# Patient Record
Sex: MALE | Race: WHITE | Employment: OTHER | URBAN - METROPOLITAN AREA
[De-identification: names, ages, dates, MRNs, and addresses within clinical notes are randomized per-mention and may not be internally consistent; named-entity substitution may affect disease eponyms.]

---

## 2024-08-23 ENCOUNTER — APPOINTMENT (OUTPATIENT)
Dept: GENERAL RADIOLOGY | Age: 84
DRG: 055 | End: 2024-08-23
Payer: MEDICAID

## 2024-08-23 ENCOUNTER — APPOINTMENT (OUTPATIENT)
Dept: CT IMAGING | Age: 84
DRG: 055 | End: 2024-08-23
Payer: MEDICAID

## 2024-08-23 ENCOUNTER — HOSPITAL ENCOUNTER (EMERGENCY)
Age: 84
Discharge: ANOTHER HOSPITAL | DRG: 055 | End: 2024-08-23
Payer: MEDICAID

## 2024-08-23 ENCOUNTER — APPOINTMENT (OUTPATIENT)
Age: 84
DRG: 055 | End: 2024-08-23
Payer: MEDICAID

## 2024-08-23 ENCOUNTER — HOSPITAL ENCOUNTER (INPATIENT)
Age: 84
LOS: 4 days | Discharge: HOME | DRG: 055 | End: 2024-08-27
Attending: EMERGENCY MEDICINE | Admitting: SURGERY
Payer: MEDICAID

## 2024-08-23 VITALS
HEART RATE: 72 BPM | WEIGHT: 149 LBS | BODY MASS INDEX: 23.39 KG/M2 | HEIGHT: 67 IN | RESPIRATION RATE: 19 BRPM | SYSTOLIC BLOOD PRESSURE: 150 MMHG | OXYGEN SATURATION: 96 % | DIASTOLIC BLOOD PRESSURE: 50 MMHG | TEMPERATURE: 98.1 F

## 2024-08-23 DIAGNOSIS — R55 SYNCOPE, UNSPECIFIED SYNCOPE TYPE: ICD-10-CM

## 2024-08-23 DIAGNOSIS — I62.9 INTRACRANIAL HEMORRHAGE (HCC): Primary | ICD-10-CM

## 2024-08-23 DIAGNOSIS — I60.9 SAH (SUBARACHNOID HEMORRHAGE) (HCC): ICD-10-CM

## 2024-08-23 DIAGNOSIS — R55 SYNCOPE AND COLLAPSE: Primary | ICD-10-CM

## 2024-08-23 DIAGNOSIS — R55 PRE-SYNCOPE: ICD-10-CM

## 2024-08-23 LAB
25(OH)D3 SERPL-MCNC: 36.4 NG/ML (ref 30–100)
ABO + RH BLD: NORMAL
ALBUMIN SERPL-MCNC: 4.1 G/DL (ref 3.5–5.2)
ALBUMIN SERPL-MCNC: 4.4 G/DL (ref 3.5–5.2)
ALP SERPL-CCNC: 53 U/L (ref 40–129)
ALT SERPL-CCNC: 22 U/L (ref 5–41)
AMPHET UR QL SCN: NEGATIVE
ANION GAP SERPL CALCULATED.3IONS-SCNC: 12 MMOL/L (ref 9–16)
ANION GAP SERPL CALCULATED.3IONS-SCNC: 13 MMOL/L
ARM BAND NUMBER: NORMAL
AST SERPL-CCNC: 20 U/L
BARBITURATES UR QL SCN: NEGATIVE
BASOPHILS # BLD: 0.03 K/UL (ref 0–0.2)
BASOPHILS NFR BLD: 1 % (ref 0–2)
BENZODIAZ UR QL: NEGATIVE
BILIRUB SERPL-MCNC: 0.6 MG/DL (ref 0.3–1.2)
BILIRUB UR QL STRIP: NEGATIVE
BLOOD BANK SAMPLE EXPIRATION: NORMAL
BLOOD BANK SPECIMEN: ABNORMAL
BLOOD GROUP ANTIBODIES SERPL: NEGATIVE
BODY TEMPERATURE: 37
BUN SERPL-MCNC: 20 MG/DL (ref 8–23)
BUN SERPL-MCNC: 22 MG/DL (ref 8–23)
BUN/CREAT SERPL: 20 (ref 9–20)
CALCIUM SERPL-MCNC: 9.3 MG/DL (ref 8.6–10.4)
CANNABINOIDS UR QL SCN: NEGATIVE
CHLORIDE SERPL-SCNC: 100 MMOL/L (ref 98–107)
CHLORIDE SERPL-SCNC: 103 MMOL/L (ref 98–107)
CK SERPL-CCNC: 220 U/L (ref 39–308)
CLARITY UR: CLEAR
CO2 SERPL-SCNC: 23 MMOL/L (ref 20–31)
CO2 SERPL-SCNC: 24 MMOL/L (ref 20–31)
COCAINE UR QL SCN: NEGATIVE
COHGB MFR BLD: 1.1 % (ref 0–5)
COLOR UR: YELLOW
COMMENT: ABNORMAL
CREAT SERPL-MCNC: 1.1 MG/DL (ref 0.7–1.2)
CREAT SERPL-MCNC: 1.3 MG/DL (ref 0.7–1.2)
EKG ATRIAL RATE: 70 BPM
EKG P AXIS: 69 DEGREES
EKG P-R INTERVAL: 164 MS
EKG Q-T INTERVAL: 384 MS
EKG QRS DURATION: 92 MS
EKG QTC CALCULATION (BAZETT): 414 MS
EKG R AXIS: -2 DEGREES
EKG T AXIS: 57 DEGREES
EKG VENTRICULAR RATE: 70 BPM
EOSINOPHIL # BLD: 0.08 K/UL (ref 0–0.44)
EOSINOPHILS RELATIVE PERCENT: 2 % (ref 1–4)
ERYTHROCYTE [DISTWIDTH] IN BLOOD BY AUTOMATED COUNT: 13 % (ref 11.8–14.4)
ERYTHROCYTE [DISTWIDTH] IN BLOOD BY AUTOMATED COUNT: 13 % (ref 11.8–14.4)
EST. AVERAGE GLUCOSE BLD GHB EST-MCNC: 126 MG/DL
ETHANOL PERCENT: <0.01 %
ETHANOLAMINE SERPL-MCNC: <10 MG/DL (ref 0–0.08)
FENTANYL UR QL: NEGATIVE
FIO2 ON VENT: ABNORMAL %
GFR, ESTIMATED: 56 ML/MIN/1.73M2
GFR, ESTIMATED: 66 ML/MIN/1.73M2
GLUCOSE BLD-MCNC: 116 MG/DL (ref 75–110)
GLUCOSE BLD-MCNC: 94 MG/DL (ref 75–110)
GLUCOSE SERPL-MCNC: 116 MG/DL (ref 74–99)
GLUCOSE SERPL-MCNC: 121 MG/DL (ref 70–99)
GLUCOSE UR STRIP-MCNC: NEGATIVE MG/DL
HBA1C MFR BLD: 6 % (ref 4–6)
HCO3 VENOUS: 28.9 MMOL/L (ref 24–30)
HCT VFR BLD AUTO: 49.3 % (ref 40.7–50.3)
HCT VFR BLD AUTO: 50.1 % (ref 40.7–50.3)
HGB BLD-MCNC: 16.6 G/DL (ref 13–17)
HGB BLD-MCNC: 16.7 G/DL (ref 13–17)
HGB UR QL STRIP.AUTO: NEGATIVE
IMM GRANULOCYTES # BLD AUTO: 0.01 K/UL (ref 0–0.3)
IMM GRANULOCYTES NFR BLD: 0 %
INR PPP: 1
INR PPP: 1.1
KETONES UR STRIP-MCNC: NEGATIVE MG/DL
LEUKOCYTE ESTERASE UR QL STRIP: NEGATIVE
LYMPHOCYTES NFR BLD: 1.37 K/UL (ref 1.1–3.7)
LYMPHOCYTES RELATIVE PERCENT: 29 % (ref 24–43)
MCH RBC QN AUTO: 29 PG (ref 25.2–33.5)
MCH RBC QN AUTO: 29 PG (ref 25.2–33.5)
MCHC RBC AUTO-ENTMCNC: 33.3 G/DL (ref 28.4–34.8)
MCHC RBC AUTO-ENTMCNC: 33.7 G/DL (ref 28.4–34.8)
MCV RBC AUTO: 86.2 FL (ref 82.6–102.9)
MCV RBC AUTO: 87 FL (ref 82.6–102.9)
METHADONE UR QL: NEGATIVE
MONOCYTES NFR BLD: 0.37 K/UL (ref 0.1–1.2)
MONOCYTES NFR BLD: 8 % (ref 3–12)
MYOGLOBIN SERPL-MCNC: 211 NG/ML (ref 28–72)
NEUTROPHILS NFR BLD: 60 % (ref 36–65)
NEUTS SEG NFR BLD: 2.82 K/UL (ref 1.5–8.1)
NITRITE UR QL STRIP: NEGATIVE
NRBC BLD-RTO: 0 PER 100 WBC
NRBC BLD-RTO: 0 PER 100 WBC
O2 SAT, VEN: 47.8 % (ref 60–85)
OPIATES UR QL SCN: NEGATIVE
OXYCODONE UR QL SCN: NEGATIVE
PARTIAL THROMBOPLASTIN TIME: 25.2 SEC (ref 23.9–33.8)
PARTIAL THROMBOPLASTIN TIME: 25.4 SEC (ref 23–36.5)
PCO2 VENOUS: 46.9 MM HG (ref 39–55)
PCP UR QL SCN: NEGATIVE
PH UR STRIP: 5.5 [PH] (ref 5–8)
PH VENOUS: 7.41 (ref 7.32–7.42)
PLATELET # BLD AUTO: 133 K/UL (ref 138–453)
PLATELET # BLD AUTO: 148 K/UL (ref 138–453)
PMV BLD AUTO: 11.4 FL (ref 8.1–13.5)
PMV BLD AUTO: 11.6 FL (ref 8.1–13.5)
PO2 VENOUS: 28.4 MM HG (ref 30–50)
POSITIVE BASE EXCESS, VEN: 3.7 MMOL/L (ref 0–2)
POTASSIUM SERPL-SCNC: 4.2 MMOL/L (ref 3.7–5.3)
POTASSIUM SERPL-SCNC: 4.5 MMOL/L (ref 3.7–5.3)
PROT SERPL-MCNC: 7.4 G/DL (ref 6.4–8.3)
PROT UR STRIP-MCNC: NEGATIVE MG/DL
PROTHROMBIN TIME: 13.5 SEC (ref 11.5–14.2)
PROTHROMBIN TIME: 13.8 SEC (ref 11.7–14.9)
RBC # BLD AUTO: 5.72 M/UL (ref 4.21–5.77)
RBC # BLD AUTO: 5.76 M/UL (ref 4.21–5.77)
SODIUM SERPL-SCNC: 137 MMOL/L (ref 135–144)
SODIUM SERPL-SCNC: 138 MMOL/L (ref 136–145)
SP GR UR STRIP: 1.04 (ref 1–1.03)
T4 FREE SERPL-MCNC: 1.9 NG/DL (ref 0.92–1.68)
TEST INFORMATION: NORMAL
TROPONIN I SERPL HS-MCNC: 25 NG/L (ref 0–22)
TROPONIN I SERPL HS-MCNC: 35 NG/L (ref 0–22)
TSH SERPL DL<=0.05 MIU/L-ACNC: 0.02 UIU/ML (ref 0.27–4.2)
UROBILINOGEN UR STRIP-ACNC: NORMAL EU/DL (ref 0–1)
VIT B12 SERPL-MCNC: 833 PG/ML (ref 232–1245)
WBC OTHER # BLD: 4.7 K/UL (ref 3.5–11.3)
WBC OTHER # BLD: 6.5 K/UL (ref 3.5–11.3)

## 2024-08-23 PROCEDURE — 93005 ELECTROCARDIOGRAM TRACING: CPT

## 2024-08-23 PROCEDURE — 73080 X-RAY EXAM OF ELBOW: CPT

## 2024-08-23 PROCEDURE — 6370000000 HC RX 637 (ALT 250 FOR IP): Performed by: STUDENT IN AN ORGANIZED HEALTH CARE EDUCATION/TRAINING PROGRAM

## 2024-08-23 PROCEDURE — 99285 EMERGENCY DEPT VISIT HI MDM: CPT

## 2024-08-23 PROCEDURE — 85025 COMPLETE CBC W/AUTO DIFF WBC: CPT

## 2024-08-23 PROCEDURE — 85730 THROMBOPLASTIN TIME PARTIAL: CPT

## 2024-08-23 PROCEDURE — 6810039001 HC L1 TRAUMA PRIORITY

## 2024-08-23 PROCEDURE — 90715 TDAP VACCINE 7 YRS/> IM: CPT

## 2024-08-23 PROCEDURE — 81003 URINALYSIS AUTO W/O SCOPE: CPT

## 2024-08-23 PROCEDURE — 82805 BLOOD GASES W/O2 SATURATION: CPT

## 2024-08-23 PROCEDURE — 82947 ASSAY GLUCOSE BLOOD QUANT: CPT

## 2024-08-23 PROCEDURE — G0480 DRUG TEST DEF 1-7 CLASSES: HCPCS

## 2024-08-23 PROCEDURE — 90471 IMMUNIZATION ADMIN: CPT

## 2024-08-23 PROCEDURE — 6360000002 HC RX W HCPCS

## 2024-08-23 PROCEDURE — 82565 ASSAY OF CREATININE: CPT

## 2024-08-23 PROCEDURE — 86901 BLOOD TYPING SEROLOGIC RH(D): CPT

## 2024-08-23 PROCEDURE — 99222 1ST HOSP IP/OBS MODERATE 55: CPT | Performed by: NEUROLOGICAL SURGERY

## 2024-08-23 PROCEDURE — 85027 COMPLETE CBC AUTOMATED: CPT

## 2024-08-23 PROCEDURE — 80053 COMPREHEN METABOLIC PANEL: CPT

## 2024-08-23 PROCEDURE — 83874 ASSAY OF MYOGLOBIN: CPT

## 2024-08-23 PROCEDURE — 2000000000 HC ICU R&B

## 2024-08-23 PROCEDURE — 83036 HEMOGLOBIN GLYCOSYLATED A1C: CPT

## 2024-08-23 PROCEDURE — 84520 ASSAY OF UREA NITROGEN: CPT

## 2024-08-23 PROCEDURE — 70486 CT MAXILLOFACIAL W/O DYE: CPT

## 2024-08-23 PROCEDURE — 84443 ASSAY THYROID STIM HORMONE: CPT

## 2024-08-23 PROCEDURE — 70450 CT HEAD/BRAIN W/O DYE: CPT

## 2024-08-23 PROCEDURE — 82306 VITAMIN D 25 HYDROXY: CPT

## 2024-08-23 PROCEDURE — 84439 ASSAY OF FREE THYROXINE: CPT

## 2024-08-23 PROCEDURE — 92523 SPEECH SOUND LANG COMPREHEN: CPT

## 2024-08-23 PROCEDURE — 70498 CT ANGIOGRAPHY NECK: CPT

## 2024-08-23 PROCEDURE — 85610 PROTHROMBIN TIME: CPT

## 2024-08-23 PROCEDURE — 82607 VITAMIN B-12: CPT

## 2024-08-23 PROCEDURE — 72125 CT NECK SPINE W/O DYE: CPT

## 2024-08-23 PROCEDURE — 99223 1ST HOSP IP/OBS HIGH 75: CPT | Performed by: PSYCHIATRY & NEUROLOGY

## 2024-08-23 PROCEDURE — 6360000004 HC RX CONTRAST MEDICATION: Performed by: NEUROLOGICAL SURGERY

## 2024-08-23 PROCEDURE — 94761 N-INVAS EAR/PLS OXIMETRY MLT: CPT

## 2024-08-23 PROCEDURE — 80307 DRUG TEST PRSMV CHEM ANLYZR: CPT

## 2024-08-23 PROCEDURE — 80051 ELECTROLYTE PANEL: CPT

## 2024-08-23 PROCEDURE — 99223 1ST HOSP IP/OBS HIGH 75: CPT | Performed by: SURGERY

## 2024-08-23 PROCEDURE — 86900 BLOOD TYPING SEROLOGIC ABO: CPT

## 2024-08-23 PROCEDURE — 72170 X-RAY EXAM OF PELVIS: CPT

## 2024-08-23 PROCEDURE — 84703 CHORIONIC GONADOTROPIN ASSAY: CPT

## 2024-08-23 PROCEDURE — 70496 CT ANGIOGRAPHY HEAD: CPT

## 2024-08-23 PROCEDURE — 71250 CT THORAX DX C-: CPT

## 2024-08-23 PROCEDURE — 2580000003 HC RX 258: Performed by: STUDENT IN AN ORGANIZED HEALTH CARE EDUCATION/TRAINING PROGRAM

## 2024-08-23 PROCEDURE — 36415 COLL VENOUS BLD VENIPUNCTURE: CPT

## 2024-08-23 PROCEDURE — 84484 ASSAY OF TROPONIN QUANT: CPT

## 2024-08-23 PROCEDURE — 82040 ASSAY OF SERUM ALBUMIN: CPT

## 2024-08-23 PROCEDURE — 82550 ASSAY OF CK (CPK): CPT

## 2024-08-23 PROCEDURE — 86850 RBC ANTIBODY SCREEN: CPT

## 2024-08-23 RX ORDER — SODIUM CHLORIDE 0.9 % (FLUSH) 0.9 %
5-40 SYRINGE (ML) INJECTION PRN
Status: DISCONTINUED | OUTPATIENT
Start: 2024-08-23 | End: 2024-08-27

## 2024-08-23 RX ORDER — METOPROLOL SUCCINATE 25 MG/1
25 TABLET, EXTENDED RELEASE ORAL DAILY
COMMUNITY

## 2024-08-23 RX ORDER — INSULIN LISPRO 100 [IU]/ML
0-16 INJECTION, SOLUTION INTRAVENOUS; SUBCUTANEOUS EVERY 4 HOURS
Status: DISCONTINUED | OUTPATIENT
Start: 2024-08-23 | End: 2024-08-24

## 2024-08-23 RX ORDER — HYDROCHLOROTHIAZIDE 12.5 MG/1
12.5 CAPSULE ORAL DAILY
Status: DISCONTINUED | OUTPATIENT
Start: 2024-08-23 | End: 2024-08-24

## 2024-08-23 RX ORDER — LABETALOL HYDROCHLORIDE 5 MG/ML
5 INJECTION, SOLUTION INTRAVENOUS ONCE
Status: DISCONTINUED | OUTPATIENT
Start: 2024-08-23 | End: 2024-08-27

## 2024-08-23 RX ORDER — SODIUM CHLORIDE 0.9 % (FLUSH) 0.9 %
3 SYRINGE (ML) INJECTION EVERY 8 HOURS
Status: DISCONTINUED | OUTPATIENT
Start: 2024-08-23 | End: 2024-08-23 | Stop reason: HOSPADM

## 2024-08-23 RX ORDER — SODIUM CHLORIDE 9 MG/ML
INJECTION, SOLUTION INTRAVENOUS CONTINUOUS
Status: DISCONTINUED | OUTPATIENT
Start: 2024-08-23 | End: 2024-08-26

## 2024-08-23 RX ORDER — MAGNESIUM SULFATE IN WATER 40 MG/ML
2000 INJECTION, SOLUTION INTRAVENOUS PRN
Status: DISCONTINUED | OUTPATIENT
Start: 2024-08-23 | End: 2024-08-27

## 2024-08-23 RX ORDER — POLYETHYLENE GLYCOL 3350 17 G/17G
17 POWDER, FOR SOLUTION ORAL DAILY
Status: DISCONTINUED | OUTPATIENT
Start: 2024-08-23 | End: 2024-08-27 | Stop reason: HOSPADM

## 2024-08-23 RX ORDER — FELODIPINE 10 MG/1
10 TABLET, EXTENDED RELEASE ORAL DAILY
COMMUNITY

## 2024-08-23 RX ORDER — ATORVASTATIN CALCIUM 20 MG/1
20 TABLET, FILM COATED ORAL NIGHTLY
Status: DISCONTINUED | OUTPATIENT
Start: 2024-08-23 | End: 2024-08-26

## 2024-08-23 RX ORDER — ATORVASTATIN CALCIUM 20 MG/1
20 TABLET, FILM COATED ORAL DAILY
COMMUNITY

## 2024-08-23 RX ORDER — METFORMIN HCL 500 MG
500 TABLET, EXTENDED RELEASE 24 HR ORAL
COMMUNITY

## 2024-08-23 RX ORDER — SODIUM CHLORIDE 0.9 % (FLUSH) 0.9 %
5-40 SYRINGE (ML) INJECTION EVERY 12 HOURS SCHEDULED
Status: DISCONTINUED | OUTPATIENT
Start: 2024-08-23 | End: 2024-08-27 | Stop reason: HOSPADM

## 2024-08-23 RX ORDER — LABETALOL HYDROCHLORIDE 5 MG/ML
INJECTION, SOLUTION INTRAVENOUS
Status: DISPENSED
Start: 2024-08-23 | End: 2024-08-23

## 2024-08-23 RX ORDER — CANDESARTAN 16 MG/1
16 TABLET ORAL DAILY
COMMUNITY

## 2024-08-23 RX ORDER — ONDANSETRON 2 MG/ML
4 INJECTION INTRAMUSCULAR; INTRAVENOUS EVERY 6 HOURS PRN
Status: DISCONTINUED | OUTPATIENT
Start: 2024-08-23 | End: 2024-08-26 | Stop reason: SDUPTHER

## 2024-08-23 RX ORDER — METOPROLOL SUCCINATE 25 MG/1
25 TABLET, EXTENDED RELEASE ORAL DAILY
Status: DISCONTINUED | OUTPATIENT
Start: 2024-08-23 | End: 2024-08-24

## 2024-08-23 RX ORDER — SODIUM CHLORIDE 9 MG/ML
INJECTION, SOLUTION INTRAVENOUS PRN
Status: DISCONTINUED | OUTPATIENT
Start: 2024-08-23 | End: 2024-08-27 | Stop reason: HOSPADM

## 2024-08-23 RX ORDER — LEVOTHYROXINE SODIUM 150 UG/1
150 TABLET ORAL DAILY
COMMUNITY

## 2024-08-23 RX ORDER — CANDESARTAN CILEXETIL AND HYDROCHLOROTHIAZIDE 16; 12.5 MG/1; MG/1
1 TABLET ORAL DAILY
Status: DISCONTINUED | OUTPATIENT
Start: 2024-08-23 | End: 2024-08-23

## 2024-08-23 RX ORDER — IOPAMIDOL 755 MG/ML
75 INJECTION, SOLUTION INTRAVASCULAR
Status: COMPLETED | OUTPATIENT
Start: 2024-08-23 | End: 2024-08-23

## 2024-08-23 RX ORDER — LEVETIRACETAM 500 MG/1
500 TABLET ORAL 2 TIMES DAILY
Status: DISCONTINUED | OUTPATIENT
Start: 2024-08-23 | End: 2024-08-27 | Stop reason: HOSPADM

## 2024-08-23 RX ORDER — ASPIRIN 81 MG/1
81 TABLET ORAL DAILY
COMMUNITY

## 2024-08-23 RX ORDER — POTASSIUM CHLORIDE 7.45 MG/ML
10 INJECTION INTRAVENOUS PRN
Status: DISCONTINUED | OUTPATIENT
Start: 2024-08-23 | End: 2024-08-27

## 2024-08-23 RX ORDER — LEVOTHYROXINE SODIUM 75 UG/1
150 TABLET ORAL DAILY
Status: DISCONTINUED | OUTPATIENT
Start: 2024-08-23 | End: 2024-08-26

## 2024-08-23 RX ORDER — POTASSIUM CHLORIDE 29.8 MG/ML
20 INJECTION INTRAVENOUS PRN
Status: DISCONTINUED | OUTPATIENT
Start: 2024-08-23 | End: 2024-08-27

## 2024-08-23 RX ORDER — VALSARTAN 160 MG/1
160 TABLET ORAL DAILY
Status: DISCONTINUED | OUTPATIENT
Start: 2024-08-23 | End: 2024-08-24

## 2024-08-23 RX ORDER — ONDANSETRON 4 MG/1
4 TABLET, ORALLY DISINTEGRATING ORAL EVERY 8 HOURS PRN
Status: DISCONTINUED | OUTPATIENT
Start: 2024-08-23 | End: 2024-08-26 | Stop reason: SDUPTHER

## 2024-08-23 RX ORDER — LEVETIRACETAM 10 MG/ML
INJECTION INTRAVASCULAR
Status: DISPENSED
Start: 2024-08-23 | End: 2024-08-23

## 2024-08-23 RX ORDER — ACETAMINOPHEN 500 MG
1000 TABLET ORAL EVERY 8 HOURS SCHEDULED
Status: DISCONTINUED | OUTPATIENT
Start: 2024-08-23 | End: 2024-08-27 | Stop reason: HOSPADM

## 2024-08-23 RX ADMIN — TETANUS TOXOID, REDUCED DIPHTHERIA TOXOID AND ACELLULAR PERTUSSIS VACCINE, ADSORBED 0.5 ML: 5; 2.5; 8; 8; 2.5 SUSPENSION INTRAMUSCULAR at 08:27

## 2024-08-23 RX ADMIN — ATORVASTATIN CALCIUM 20 MG: 20 TABLET, FILM COATED ORAL at 20:11

## 2024-08-23 RX ADMIN — SODIUM CHLORIDE: 9 INJECTION, SOLUTION INTRAVENOUS at 12:49

## 2024-08-23 RX ADMIN — LEVOTHYROXINE SODIUM 150 MCG: 75 TABLET ORAL at 12:35

## 2024-08-23 RX ADMIN — METOPROLOL SUCCINATE 25 MG: 25 TABLET, EXTENDED RELEASE ORAL at 12:31

## 2024-08-23 RX ADMIN — ACETAMINOPHEN 1000 MG: 500 TABLET ORAL at 20:11

## 2024-08-23 RX ADMIN — IOPAMIDOL 75 ML: 755 INJECTION, SOLUTION INTRAVENOUS at 13:22

## 2024-08-23 RX ADMIN — ACETAMINOPHEN 1000 MG: 500 TABLET ORAL at 12:30

## 2024-08-23 RX ADMIN — LEVETIRACETAM 500 MG: 500 TABLET, FILM COATED ORAL at 16:38

## 2024-08-23 RX ADMIN — SODIUM CHLORIDE, PRESERVATIVE FREE 5 ML: 5 INJECTION INTRAVENOUS at 12:35

## 2024-08-23 RX ADMIN — HYDROCHLOROTHIAZIDE 12.5 MG: 12.5 CAPSULE ORAL at 12:30

## 2024-08-23 RX ADMIN — VALSARTAN 160 MG: 160 TABLET, FILM COATED ORAL at 12:30

## 2024-08-23 ASSESSMENT — ENCOUNTER SYMPTOMS
VOMITING: 0
COLOR CHANGE: 1
ABDOMINAL PAIN: 0
BACK PAIN: 0
SHORTNESS OF BREATH: 0
CHEST TIGHTNESS: 0
FACIAL SWELLING: 1
NAUSEA: 0
WHEEZING: 0

## 2024-08-23 ASSESSMENT — PAIN SCALES - GENERAL
PAINLEVEL_OUTOF10: 2
PAINLEVEL_OUTOF10: 4
PAINLEVEL_OUTOF10: 2
PAINLEVEL_OUTOF10: 0
PAINLEVEL_OUTOF10: 4

## 2024-08-23 ASSESSMENT — PAIN - FUNCTIONAL ASSESSMENT
PAIN_FUNCTIONAL_ASSESSMENT: 0-10
PAIN_FUNCTIONAL_ASSESSMENT: 0-10

## 2024-08-23 ASSESSMENT — VISUAL ACUITY: OU: 1

## 2024-08-23 NOTE — CARE COORDINATION
Case Management Assessment  Initial Evaluation    Date/Time of Evaluation: 8/23/2024 3:48 PM  Assessment Completed by: Soila Arana RN    If patient is discharged prior to next notation, then this note serves as note for discharge by case management.    Patient Name: Nataliia Lao                   YOB: 1940  Diagnosis: SAH (subarachnoid hemorrhage) (HCC) [I60.9]                   Date / Time: 8/23/2024  9:42 AM    Patient Admission Status: Inpatient   Readmission Risk (Low < 19, Mod (19-27), High > 27): Readmission Risk Score: 9.3    Current PCP: No primary care provider on file.  PCP verified by CM? Yes (states he does have PCP but can not remember name)    Chart Reviewed: Yes      History Provided by: Child/Family  Patient Orientation: Alert and Oriented    Patient Cognition: Alert    Hospitalization in the last 30 days (Readmission):  No    If yes, Readmission Assessment in CM Navigator will be completed.    Advance Directives:      Code Status: Full Code   Patient's Primary Decision Maker is: Legal Next of Kin      Discharge Planning:    Patient lives with: Family Members Type of Home: Apartment  Primary Care Giver: Self  Patient Support Systems include: Spouse/Significant Other, Family Members   Current Financial resources: None  Current community resources: None  Current services prior to admission: (P) None            Current DME:              Type of Home Care services:  (P) None    ADLS  Prior functional level: Independent in ADLs/IADLs  Current functional level: Other (see comment), Mobility, Housework, Cooking    PT AM-PAC:   /24  OT AM-PAC:   /24    Family can provide assistance at DC: Yes  Would you like Case Management to discuss the discharge plan with any other family members/significant others, and if so, who? Yes (family)  Plans to Return to Present Housing: Unknown at present  Other Identified Issues/Barriers to RETURNING to current housing: none  Potential Assistance needed

## 2024-08-23 NOTE — ED PROVIDER NOTES
[facility] ED  EMERGENCY DEPARTMENT ENCOUNTER   ATTENDING ATTESTATION     Pt Name: Nataliia Lao  MRN: [id number]  Birthdate 1940  Date of evaluation: 8/23/24       Nataliia Lao is a 84 y.o. male who presents with Fall      MDM:     84-year-old male presents with complaints of a fall and head injury, patient CT scan shows evidence of a probable traumatic subdural and subarachnoid hemorrhage, patient will be transferred to [facility].  I discussed the case with Dr. Ndiaye in the emergency department who accepts the patient in transfer.  We will add a PT/INR.    Impression: Traumatic subarachnoid hemorrhage, traumatic subdural hemorrhage.    Vitals:   Vitals:    08/23/24 0805 08/23/24 0811 08/23/24 0815 08/23/24 0819   BP: (!) 155/52  (!) 159/59    Pulse: 64 74 76 73   Resp: 12 19 24 21   Temp:       TempSrc:       SpO2: 98% 97% 97% 97%   Weight:       Height:               Bladimir Spivey MD  Attending Emergency  Physician                 Bladimir Spivey MD  08/23/24 0828

## 2024-08-23 NOTE — CONSULTS
Endovascular Neurosurgery Consult    Reason for evaluation: incidentally-discovered large L AVM  Referring Provider: Ana Ceballos MD     SUBJECTIVE:   History of Chief Complaint:    Nataliia Lao is a 84 y.o. male with pmh of ?DM on Metformin, HLD, taking Aspirin by pt's choice. Pt fell at home GLF and was admitted to the trauma team. CT brain incidentally discovered a large L AVM, which was confirmed on the CTA head. EVN consulted for AVM management.    Allergies  has No Known Allergies.  Medications  Prior to Admission medications    Medication Sig Start Date End Date Taking? Authorizing Provider   levothyroxine (SYNTHROID) 150 MCG tablet Take 1 tablet by mouth Daily   Yes Stacy Pathak MD   atorvastatin (LIPITOR) 20 MG tablet Take 1 tablet by mouth daily   Yes Stacy Pathak MD   metFORMIN (GLUCOPHAGE-XR) 500 MG extended release tablet Take 1 tablet by mouth daily (with breakfast)   Yes Stacy Pathak MD   felodipine (PLENDIL) 10 MG extended release tablet Take 1 tablet by mouth daily   Yes Stacy Pathak MD   metoprolol succinate (TOPROL XL) 25 MG extended release tablet Take 1 tablet by mouth daily   Yes Stacy Pathak MD   candesartan (ATACAND) 16 MG tablet Take 1 tablet by mouth daily   Yes Stacy Pathak MD   aspirin 81 MG EC tablet Take 1 tablet by mouth daily    Stacy Pathak MD    Scheduled Meds:   levETIRAcetam in NaCl        labetalol  5 mg IntraVENous Once    labetalol        sodium chloride flush  5-40 mL IntraVENous 2 times per day    polyethylene glycol  17 g Oral Daily    acetaminophen  1,000 mg Oral 3 times per day    levETIRAcetam  500 mg Oral BID    atorvastatin  20 mg Oral Nightly    levothyroxine  150 mcg Oral Daily    metoprolol succinate  25 mg Oral Daily    insulin lispro  0-16 Units SubCUTAneous Q4H    valsartan  160 mg Oral Daily    And    hydroCHLOROthiazide  12.5 mg Oral Daily     Continuous Infusions:   sodium          I reviewed the Fellow's note and agree with the documented findings and plan of care. Any areas of disagreement are noted on the chart. I agree with the chief complaint, past medical history, past surgical history, allergies, medications, social and family history as documented unless otherwise noted below. I have personally seen and evaluated the patient and images. I find the patient's history and physical exam are consistent with the Fellow's documentation. I agree with the critical and key portions of the care provided, treatment rendered, disposition and follow-up plan.    Late Entry Note for Date of Endovascular Neurosurgery/Stroke Service rendered on 8-.    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  Plan for possible dsa as recommendation    80 minutes with greater than 50% of time spent face to face, examining patient, counseling, coordinating care, obtaining history, reviewing images, labs, and other notes personally.    Gio Porras MD  Office [phone]. Cell [phone]  Stroke, Neurocritical Care & Neurointervention  [facility]  [facility]  [facility] [place]

## 2024-08-23 NOTE — PROGRESS NOTES
SLP ALL NOTES  Facility/Department: [facility] CAR 1- SICU  Initial Speech/Language/Cognitive Assessment    NAME: Nataliia Lao  : 1940   MRN: [id number]  ADMISSION DATE: 2024  ADMITTING DIAGNOSIS: has SAH (subarachnoid hemorrhage) (HCC) on their problem list.    Date of Eval: 2024   Evaluating Therapist: LUCY Bull    Primary Complaint:   Nataliia Lao is a 85 yo male that presented to the Emergency Department following fall at home. There was reported LOC with confusion after fall. Pt on ASA. Taken to [facility] and imaged. Possible SAH/SDH reported. Transferred to [facility]. GCS 15 and AAOX3 on arrival. L hip pain. No obvious bleeding/hematoma. IVF and Keppra given.     Pain:  Pain Assessment  Pain Assessment: 0-10  Pain Level: 0    Vision/ Hearing  Vision  Vision: Within Functional Limits  Hearing  Hearing: Exceptions to WFL  Hearing Exceptions: Hard of hearing/hearing concerns    Assessment:     Pt presents with no apparent cognitive deficits at this time.  No dysarthria noted, no oral motor deficits. Pt and son report pt's cognition is at baseline. No further ST is recommended.  Verbal and written education provided.       LUCY Bull, M.S. CCC-SLP  2024         Recommendations:  Recommendations  Requires SLP Intervention: No  Patient Education: yes  Patient Education Response: Verbalizes understanding     D/C Recommendations: No follow up therapy recommended post discharge       Plan:   Speech Therapy Prognosis  Prognosis: Good  Individuals consulted  Consulted and agree with results and recommendations: Patient;Family member      Subjective:   Previous level of function and limitations:      Social/Functional History  Lives With: Son  Type of Home: Apartment  Active : No  Occupation: Retired  Vision  Vision: Within Functional Limits  Hearing  Hearing: Exceptions to WFL  Hearing Exceptions: Hard of hearing/hearing concerns           Objective:       Oral Motor   Labial:

## 2024-08-23 NOTE — ED PROVIDER NOTES
[facility]  Emergency Department  Faculty Attestation     I performed a history and physical examination of the patient and discussed management with the resident. I reviewed the resident’s note and agree with the documented findings and plan of care. Any areas of disagreement are noted on the chart. I was personally present for the key portions of any procedures. I have documented in the chart those procedures where I was not present during the key portions. I have reviewed the emergency nurses triage note. I agree with the chief complaint, past medical history, past surgical history, allergies, medications, social and family history as documented unless otherwise noted below.    For Physician Assistant/ Nurse Practitioner cases/documentation I have personally evaluated this patient and have completed at least one if not all key elements of the E/M (history, physical exam, and MDM). Additional findings are as noted.    Preliminary note started at 9:58 AM EDT    Primary Care Physician:  No primary care provider on file.    Screenings:  [unfilled]    CHIEF COMPLAINT       Chief Complaint   Patient presents with    Fall       RECENT VITALS:   BP (!) 168/75   Pulse 99   Temp 98.1 °F (36.7 °C)   Resp 17   SpO2 97%     LABS:  Labs Reviewed - No data to display    Radiology  XR PELVIS (1-2 VIEWS)    (Results Pending)       CRITICAL CARE: There was a high probability of clinically significant/life threatening deterioration in this patient's condition which required my urgent intervention.  Total critical care time was none minutes.  This excludes any time for separately reportable procedures.     EKG:      Attending Physician Additional  Notes    Patient transferred from outside hospital as a trauma patient.  He fell landing on his left elbow and face.  He is amnestic for the event with probable loss of consciousness.  Family found him on the floor.  He is not on anticoagulation

## 2024-08-23 NOTE — PROGRESS NOTES
[facility]     Emergency/Trauma Note    PATIENT NAME: Nataliia Lao    Shift date: 08/23/2024  Shift day: Friday   Shift # 1    Room # CASSIDY/CASSIDY     Name: Nataliia Lao            Age: 84 y.o.  Gender: male          Mosque: Zoroastrian   Place of Hinduism:     Trauma/Incident type: Adult Trauma Priority  Admit Date & Time: 8/23/2024  9:42 AM  TRAUMA NAME: None    ADVANCE DIRECTIVES IN CHART?  No    NAME OF DECISION MAKER:     RELATIONSHIP OF DECISION MAKER TO PATIENT:     PATIENT/EVENT DESCRIPTION:  Nataliia Lao is a 84 y.o. male who arrived via ground ambulance as adult trauma priority. Patient was a transfer from [facility]. Per report, Patient took a fall. Patient to be admitted to CASSIDY/CASSIDY.       SPIRITUAL ASSESSMENT-INTERVENTION-OUTCOME:  No spiritual assessment was carried out because  did not talk to patient. Per report, patient was raised Zoroastrian. Family was present and receptive to spiritual care.  provided ministry of presence, offered support to family and reassured them that patient was in good hands. Family expressed appreciation for the emotional support they received.      PATIENT BELONGINGS:  This  did not handle patient's belongings.    ANY BELONGINGS OF SIGNIFICANT VALUE NOTED:  Unknown    REGISTRATION STAFF NOTIFIED?  Yes    WHAT IS YOUR SPIRITUAL CARE PLAN FOR THIS PATIENT?:  Follow up visits recommended for ongoing assessment of patient's condition and for more emotional support.     Electronically signed by Chaplain Dennis, on 8/23/2024 at 10:27 AM.  [facility]  [phone]

## 2024-08-23 NOTE — ED PROVIDER NOTES
[facility] CAR 1- SICU  Emergency Department Encounter  Emergency Medicine Resident     Pt Name:Nataliia Lao  MRN: [id number]  Birthdate 1940  Date of evaluation: 8/23/24  PCP:  No primary care provider on file.  Note Started: 10:06 AM EDT      CHIEF COMPLAINT       Chief Complaint   Patient presents with    Fall       HISTORY OF PRESENT ILLNESS  (Location/Symptom, Timing/Onset, Context/Setting, Quality, Duration, Modifying Factors, Severity.)      Nataliia Lao is a 84 y.o. male who presents as a transfer from [facility] for evaluation of intracranial hemorrhage.  Patient lives with family who \"heard a thump\" this morning and found the patient unconscious on the bathroom floor.  Upon waking he did not recognize his surroundings nor his children.  It is unclear how long this lasted for but while at the outside hospital he returned to his baseline.  Patient's only complaint at this time is left face pain, left elbow pain.  He does not recall the events surrounding the fall.    On chart review does not appear that the patient takes any blood thinners.  He does take a daily baby aspirin.    PAST MEDICAL / SURGICAL / SOCIAL / FAMILY HISTORY      has no past medical history on file.     has no past surgical history on file.    Social History     Socioeconomic History    Marital status:      Spouse name: Not on file    Number of children: Not on file    Years of education: Not on file    Highest education level: Not on file   Occupational History    Not on file   Tobacco Use    Smoking status: Not on file    Smokeless tobacco: Not on file   Substance and Sexual Activity    Alcohol use: Not on file    Drug use: Not on file    Sexual activity: Not on file   Other Topics Concern    Not on file   Social History Narrative    Not on file     Social Determinants of Health     Financial Resource Strain: Not on file   Food Insecurity: Not on file   Transportation Needs: Not on file   Physical Activity: Not  on file   Stress: Not on file   Social Connections: Not on file   Intimate Partner Violence: Not on file   Housing Stability: Not on file       No family history on file.    Allergies:  Patient has no known allergies.    Home Medications:  Prior to Admission medications    Not on File       REVIEW OF SYSTEMS       Review of Systems  All other systems negative unless otherwise stated above    PHYSICAL EXAM      INITIAL VITALS:   BP (!) 168/75   Pulse 99   Temp 98.1 °F (36.7 °C)   Resp 17   SpO2 97%     Physical Exam  Vitals and nursing note reviewed.   Constitutional:       General: He is not in acute distress.     Appearance: Normal appearance.   HENT:      Head:      Comments: Ecchymosis, small superficial abrasions surrounding left eye     Mouth/Throat:      Comments: Small laceration to left upper lip, hemostatic, does not violate vermilion border.  Eyes:      Comments: Left eye with subconjunctival hemorrhage.  PERRL.  No proptosis.  No pain with EOM   Cardiovascular:      Rate and Rhythm: Normal rate and regular rhythm.      Pulses: Normal pulses.   Pulmonary:      Effort: Pulmonary effort is normal. No respiratory distress.      Breath sounds: Normal breath sounds.   Abdominal:      Palpations: Abdomen is soft.      Tenderness: There is no abdominal tenderness. There is no guarding or rebound.   Musculoskeletal:      Cervical back: Normal range of motion. No tenderness.      Comments: Tenderness over left elbow, left chest wall.  Pelvis stable, nontender remainder of extremities without obvious evidence of trauma, deformity.   Skin:     General: Skin is warm and dry.   Neurological:      General: No focal deficit present.      Mental Status: He is alert and oriented to person, place, and time.       DDX/DIAGNOSTIC RESULTS / EMERGENCY DEPARTMENT COURSE / MDM     Medical Decision Making  Mr Lao is a 84 y.o. male who presents as a transfer from [facility] for evaluation of intracranial hemorrhage.

## 2024-08-23 NOTE — ED PROVIDER NOTES
[facility] ED  Emergency Department Encounter  Emergency Medicine Attending     Pt Name:Nataliia Lao  MRN: [id number]  Birthdate 1940  Date of evaluation: 8/23/24  PCP:  No primary care provider on file.  Note Started: 6:36 AM EDT      CHIEF COMPLAINT       Chief Complaint   Patient presents with    Fall       HISTORY OF PRESENT ILLNESS  (Location/Symptom, Timing/Onset, Context/Setting, Quality, Duration, Modifying Factors, Severity.)      84-year-old male presenting for evaluation of syncope.  Patient states that he entered in his bathroom and once he closed the door he cannot remember what happened.  They think that he fell against items while in the bathroom.  Patient was found on the ground by family.  Patient does take aspirin daily.  No DOAC.  He is currently experiencing left-sided facial pain and left rib pain and left elbow pain.  Currently denies any chest pain or shortness of breath.  Unknown last tetanus.  Denies any vision change.          PAST MEDICAL / SURGICAL / SOCIAL / FAMILY HISTORY      has no past medical history on file.     has no past surgical history on file.    Social History     Socioeconomic History    Marital status:      Spouse name: Not on file    Number of children: Not on file    Years of education: Not on file    Highest education level: Not on file   Occupational History    Not on file   Tobacco Use    Smoking status: Not on file    Smokeless tobacco: Not on file   Substance and Sexual Activity    Alcohol use: Not on file    Drug use: Not on file    Sexual activity: Not on file   Other Topics Concern    Not on file   Social History Narrative    Not on file     Social Determinants of Health     Financial Resource Strain: Not on file   Food Insecurity: Not on file   Transportation Needs: Not on file   Physical Activity: Not on file   Stress: Not on file   Social Connections: Not on file   Intimate Partner Violence: Not on file   Housing Stability: Not on file  heart sounds. No murmur heard.  Pulmonary:      Effort: Pulmonary effort is normal. No respiratory distress.      Breath sounds: Normal breath sounds. No wheezing.   Chest:       Abdominal:      General: Abdomen is flat.      Palpations: Abdomen is soft.   Musculoskeletal:         General: Normal range of motion.      Cervical back: Full passive range of motion without pain, normal range of motion and neck supple. No spinous process tenderness or muscular tenderness.      Comments: Swelling of left elbow with mild tenderness to palpation   Skin:     General: Skin is warm and dry.      Capillary Refill: Capillary refill takes less than 2 seconds.      Findings: Wound present.      Comments: Wound on left elbow   Neurological:      General: No focal deficit present.      Mental Status: He is alert and oriented to person, place, and time. Mental status is at baseline.      GCS: GCS eye subscore is 4. GCS verbal subscore is 5. GCS motor subscore is 6.      Cranial Nerves: Cranial nerves 2-12 are intact. No cranial nerve deficit.      Sensory: Sensation is intact. No sensory deficit.      Motor: Motor function is intact. No weakness.      Coordination: Coordination is intact.           DDX/DIAGNOSTIC RESULTS / EMERGENCY DEPARTMENT COURSE / MDM     Medical Decision Making  84-year-old male presenting for fall.  Initial vitals did show hypertension otherwise within normal ranges.  On exam patient is GCS 15 and neurologically intact.  Exam otherwise as noted above.  Patient sustained unwitnessed fall and is presenting with amnesia to events.  IV established and labs and imaging ordered.  Patient will be signed out to daytime physician Dr. Spivey at 7 AM on 8/23/2024.  Please refer to their documentation for final disposition and diagnosis.    Amount and/or Complexity of Data Reviewed  Labs: ordered.  Radiology: ordered.  ECG/medicine tests: ordered.    Risk  Prescription drug management.          EKG  EKG reviewed and

## 2024-08-23 NOTE — ED NOTES
Patient log rolled to patient right side. Patient denies CTL tenderness. No bruising or step off noted

## 2024-08-23 NOTE — H&P
TRAUMA H&P/CONSULT    PATIENT NAME: Nataliia Lao  YOB: 1940  MEDICAL RECORD NO. [id number]   DATE: 8/23/2024  PRIMARY CARE PHYSICIAN: No primary care provider on file.  PATIENT EVALUATED AT THE REQUEST OF : Keith BAH   Trauma Priority      IMPRESSION AND PLAN:       Diagnosis: SDH/SAH   Plan: NS consulted - appreciate recs   rCTH PM   Admit to TICU    If intracranial hemorrhage is present, is it a:  [] BIG 1  [] BIG 2  [x] BIG 3  If chest wall injury: Rib score___    CONSULT SERVICES    Neurosurgery      HISTORY:     Chief Complaint:  \"Fall\"    GENERAL DATA  Patient information was obtained from patient and EMS personnel.  History/Exam limitations: none.  Injury Date: 8/23/2024   Approximate Injury Time: 0600        Transport mode: Ambulance  Referring Hospital: [facility]    SETTING OF TRAUMATIC EVENT   Location : Home  Specific Details of Location: Bedroom    MECHANISM OF INJURY    Fall from bed. Unwitnessed      HISTORY:     Nataliia Lao is a 83 yo male that presented to the Emergency Department following fall at home. There was reported LOC with confusion after fall. Pt on ASA. Taken to [facility] and imaged. Possible SAH/SDH reported. Transferred to [facility]. GCS 15 and AAOX3 on arrival. L hip pain. No obvious bleeding/hematoma. IVF and Keppra given.     Traumatic loss of Consciousness: Yes: unknown minutes    Total Fluids Given Prior To Arrival  mL    MEDICATIONS:   []  None     []  Information not available due to exam limitations documented above    Prior to Admission medications    Not on File       ALLERGIES:   []  None    []   Information not available due to exam limitations documented above     Patient has no known allergies.    PAST MEDICAL/SURGICAL HISTORY: []  None   []   Information not available due to exam limitations documented above      has no past medical history on file.  has no past surgical history on file.    FAMILY HISTORY   []   Information not

## 2024-08-23 NOTE — CONSULTS
Department of Neurosurgery                                                             Consult Note      Reason for Consult:    Requesting Physician:    Neurosurgeon:   [] Dr. Philippe   [x] Dr. LI  [] Dr. Mendoza  []JOB ESTRADA    History Obtained From:  patient, electronic medical record    CHIEF COMPLAINT:             HISTORY OF PRESENT ILLNESS:       84-year-old male presented to [facility] after a fall.  He was found down by his family members, who heard him fall.  Patient was seen and examined at bedside, and he had left periorbital bruising and left conjunctival injection.  He had GCS 15 with normal language process.    Patient states he was walking to his bathroom when he lost consciousness, does not recall any further details until waking up at the hospital.  Initial CT head done at outside hospital showing subdural blood tracking on right tentorium, subarachnoid hemorrhage seen as sulcal hyperattenuation in the right temporal lobe, left occipital lobe mass measuring 2.1 x 1.9 x 1.7 cm which is moderately hyperdense and demonstrate several calcifications in his periphery.  There is a dilated tortuous vessels up to 8 mm thickness extending from this mass cephalad brain cortex into superior sagittal sinus region.    CTA head and neck shows AVM along left parieto-occipital region with hypertrophy of the left PCA which appears to be the main arterial supply.  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  There is still subdural hemorrhage along the right tentorium measuring 3 mm in thickness, however previous areas of subarachnoid hemorrhage along the right cerebral convexity have substantially improved.  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    Patient denied experiencing prodromal symptoms prior to the fall, including chest pain or shortness of

## 2024-08-23 NOTE — PROGRESS NOTES
15 Variable Trauma-Specific Frailty Index   Comorbidities   Cancer History Yes (1) No (0)   Coronary Heart Disease MI (1) CABG (0.75) Mild (0.25)  No (0)   Dementia Severe (1) Moderate (0.5) Mild (0.25)  No (0)   Daily Activities   Help With Grooming Yes (1) No (0)   Help with Managing Money Yes (1) No (0)   Help doing Housework Yes (1) No (0)   Help with Toileting Yes (1) No (0)   Help Walking Wheelchair (1) Walker (0.75) Cane (0.5) No (0)   Health Attitude   Feel Less Useful Most Time (1) Sometimes (0.5) Never (0)   Feel Sad Most Time (1) Sometimes (0.5) Never (0)   Feel Effort to Do Everything Most Time (1) Sometimes (0.5) Never (0)   Feels Lonely Most Time (1) Sometimes (0.5) Never (0)   Falls Most Time (1) Sometimes (0.5) Never (0)   Function   Sexually Active Yes (0)  No(1)   Nutrition   Albumin < 3g/dL (1)  > 3g/dL   Scoring   Score   FI (Score/15)  3.5/15 > 0.25 = Frail   *Based on 2-weeks prior to hospital admission   Trauma Specific Fraility Index > or = to 4 (4/15 = 0.26)  Trauma Specific Fraility Index Score:    <0.25    Francisca Zaragoza DO 08/23/24  10:44 AM   General Surgery PGY 1

## 2024-08-23 NOTE — ED NOTES
Patient son states he heard a sound like someone had fallen down.   Patient's son states he found patient his father on the floor face down with blood seen on his left eye, left side of patients mouth and left arm.   Patients son states patient was passed out and when he came to he unaware of what had happened or unable to recognize his family.

## 2024-08-24 ENCOUNTER — APPOINTMENT (OUTPATIENT)
Dept: MRI IMAGING | Age: 84
DRG: 055 | End: 2024-08-24
Payer: MEDICAID

## 2024-08-24 PROBLEM — Q28.2 AVM (ARTERIOVENOUS MALFORMATION) BRAIN: Status: ACTIVE | Noted: 2024-08-24

## 2024-08-24 LAB
ANION GAP SERPL CALCULATED.3IONS-SCNC: 12 MMOL/L (ref 9–16)
BASOPHILS # BLD: <0.03 K/UL (ref 0–0.2)
BASOPHILS NFR BLD: 0 % (ref 0–2)
BUN SERPL-MCNC: 16 MG/DL (ref 8–23)
CALCIUM SERPL-MCNC: 8.9 MG/DL (ref 8.6–10.4)
CHLORIDE SERPL-SCNC: 105 MMOL/L (ref 98–107)
CO2 SERPL-SCNC: 21 MMOL/L (ref 20–31)
CREAT SERPL-MCNC: 1.2 MG/DL (ref 0.7–1.2)
EOSINOPHIL # BLD: 0.07 K/UL (ref 0–0.44)
EOSINOPHILS RELATIVE PERCENT: 2 % (ref 1–4)
ERYTHROCYTE [DISTWIDTH] IN BLOOD BY AUTOMATED COUNT: 13.1 % (ref 11.8–14.4)
GFR, ESTIMATED: 62 ML/MIN/1.73M2
GLUCOSE BLD-MCNC: 110 MG/DL (ref 75–110)
GLUCOSE BLD-MCNC: 72 MG/DL (ref 75–110)
GLUCOSE BLD-MCNC: 75 MG/DL (ref 75–110)
GLUCOSE BLD-MCNC: 79 MG/DL (ref 75–110)
GLUCOSE SERPL-MCNC: 91 MG/DL (ref 74–99)
HCT VFR BLD AUTO: 48.1 % (ref 40.7–50.3)
HGB BLD-MCNC: 15.5 G/DL (ref 13–17)
IMM GRANULOCYTES # BLD AUTO: <0.03 K/UL (ref 0–0.3)
IMM GRANULOCYTES NFR BLD: 0 %
LYMPHOCYTES NFR BLD: 1.17 K/UL (ref 1.1–3.7)
LYMPHOCYTES RELATIVE PERCENT: 35 % (ref 24–43)
MCH RBC QN AUTO: 28.4 PG (ref 25.2–33.5)
MCHC RBC AUTO-ENTMCNC: 32.2 G/DL (ref 28.4–34.8)
MCV RBC AUTO: 88.1 FL (ref 82.6–102.9)
MONOCYTES NFR BLD: 0.44 K/UL (ref 0.1–1.2)
MONOCYTES NFR BLD: 13 % (ref 3–12)
NEUTROPHILS NFR BLD: 50 % (ref 36–65)
NEUTS SEG NFR BLD: 1.69 K/UL (ref 1.5–8.1)
NRBC BLD-RTO: 0 PER 100 WBC
PLATELET # BLD AUTO: 118 K/UL (ref 138–453)
PMV BLD AUTO: 10.9 FL (ref 8.1–13.5)
POTASSIUM SERPL-SCNC: 3.9 MMOL/L (ref 3.7–5.3)
RBC # BLD AUTO: 5.46 M/UL (ref 4.21–5.77)
SODIUM SERPL-SCNC: 138 MMOL/L (ref 136–145)
TROPONIN I SERPL HS-MCNC: 29 NG/L (ref 0–22)
WBC OTHER # BLD: 3.4 K/UL (ref 3.5–11.3)

## 2024-08-24 PROCEDURE — 85025 COMPLETE CBC W/AUTO DIFF WBC: CPT

## 2024-08-24 PROCEDURE — 6370000000 HC RX 637 (ALT 250 FOR IP)

## 2024-08-24 PROCEDURE — 6370000000 HC RX 637 (ALT 250 FOR IP): Performed by: STUDENT IN AN ORGANIZED HEALTH CARE EDUCATION/TRAINING PROGRAM

## 2024-08-24 PROCEDURE — 80048 BASIC METABOLIC PNL TOTAL CA: CPT

## 2024-08-24 PROCEDURE — 95816 EEG AWAKE AND DROWSY: CPT

## 2024-08-24 PROCEDURE — 99233 SBSQ HOSP IP/OBS HIGH 50: CPT | Performed by: PSYCHIATRY & NEUROLOGY

## 2024-08-24 PROCEDURE — 2580000003 HC RX 258: Performed by: STUDENT IN AN ORGANIZED HEALTH CARE EDUCATION/TRAINING PROGRAM

## 2024-08-24 PROCEDURE — 2060000000 HC ICU INTERMEDIATE R&B

## 2024-08-24 PROCEDURE — A9579 GAD-BASE MR CONTRAST NOS,1ML: HCPCS

## 2024-08-24 PROCEDURE — 6360000004 HC RX CONTRAST MEDICATION

## 2024-08-24 PROCEDURE — 99232 SBSQ HOSP IP/OBS MODERATE 35: CPT | Performed by: SURGERY

## 2024-08-24 PROCEDURE — 70553 MRI BRAIN STEM W/O & W/DYE: CPT

## 2024-08-24 PROCEDURE — 99232 SBSQ HOSP IP/OBS MODERATE 35: CPT | Performed by: NEUROLOGICAL SURGERY

## 2024-08-24 PROCEDURE — 82947 ASSAY GLUCOSE BLOOD QUANT: CPT

## 2024-08-24 PROCEDURE — 36415 COLL VENOUS BLD VENIPUNCTURE: CPT

## 2024-08-24 PROCEDURE — 2000000000 HC ICU R&B

## 2024-08-24 PROCEDURE — 84484 ASSAY OF TROPONIN QUANT: CPT

## 2024-08-24 RX ORDER — SENNA AND DOCUSATE SODIUM 50; 8.6 MG/1; MG/1
2 TABLET, FILM COATED ORAL 2 TIMES DAILY PRN
Status: DISCONTINUED | OUTPATIENT
Start: 2024-08-24 | End: 2024-08-24

## 2024-08-24 RX ORDER — SENNA AND DOCUSATE SODIUM 50; 8.6 MG/1; MG/1
2 TABLET, FILM COATED ORAL DAILY
COMMUNITY

## 2024-08-24 RX ORDER — GLUCAGON 1 MG/ML
1 KIT INJECTION PRN
Status: DISCONTINUED | OUTPATIENT
Start: 2024-08-24 | End: 2024-08-27 | Stop reason: HOSPADM

## 2024-08-24 RX ORDER — HYDRALAZINE HYDROCHLORIDE 20 MG/ML
10 INJECTION INTRAMUSCULAR; INTRAVENOUS EVERY 6 HOURS PRN
Status: DISCONTINUED | OUTPATIENT
Start: 2024-08-24 | End: 2024-08-27 | Stop reason: HOSPADM

## 2024-08-24 RX ORDER — INSULIN LISPRO 100 [IU]/ML
0-16 INJECTION, SOLUTION INTRAVENOUS; SUBCUTANEOUS
Status: DISCONTINUED | OUTPATIENT
Start: 2024-08-24 | End: 2024-08-27

## 2024-08-24 RX ORDER — DEXTROSE MONOHYDRATE 100 MG/ML
INJECTION, SOLUTION INTRAVENOUS CONTINUOUS PRN
Status: DISCONTINUED | OUTPATIENT
Start: 2024-08-24 | End: 2024-08-27 | Stop reason: HOSPADM

## 2024-08-24 RX ORDER — FAMOTIDINE 20 MG/1
20 TABLET, FILM COATED ORAL DAILY
Status: DISCONTINUED | OUTPATIENT
Start: 2024-08-25 | End: 2024-08-27 | Stop reason: HOSPADM

## 2024-08-24 RX ORDER — FAMOTIDINE 20 MG/1
20 TABLET, FILM COATED ORAL 2 TIMES DAILY
Status: DISCONTINUED | OUTPATIENT
Start: 2024-08-24 | End: 2024-08-24

## 2024-08-24 RX ORDER — SENNA AND DOCUSATE SODIUM 50; 8.6 MG/1; MG/1
2 TABLET, FILM COATED ORAL
Status: DISCONTINUED | OUTPATIENT
Start: 2024-08-24 | End: 2024-08-27 | Stop reason: HOSPADM

## 2024-08-24 RX ADMIN — FAMOTIDINE 20 MG: 20 TABLET, FILM COATED ORAL at 08:53

## 2024-08-24 RX ADMIN — LEVETIRACETAM 500 MG: 500 TABLET, FILM COATED ORAL at 08:41

## 2024-08-24 RX ADMIN — SODIUM CHLORIDE, PRESERVATIVE FREE 10 ML: 5 INJECTION INTRAVENOUS at 23:00

## 2024-08-24 RX ADMIN — GADOTERIDOL 12 ML: 279.3 INJECTION, SOLUTION INTRAVENOUS at 12:16

## 2024-08-24 RX ADMIN — LEVETIRACETAM 500 MG: 500 TABLET, FILM COATED ORAL at 21:07

## 2024-08-24 RX ADMIN — ATORVASTATIN CALCIUM 20 MG: 20 TABLET, FILM COATED ORAL at 21:07

## 2024-08-24 RX ADMIN — ACETAMINOPHEN 1000 MG: 500 TABLET ORAL at 14:47

## 2024-08-24 RX ADMIN — LEVOTHYROXINE SODIUM 150 MCG: 75 TABLET ORAL at 08:41

## 2024-08-24 RX ADMIN — VALSARTAN 160 MG: 160 TABLET, FILM COATED ORAL at 08:41

## 2024-08-24 RX ADMIN — SENNOSIDES AND DOCUSATE SODIUM 2 TABLET: 50; 8.6 TABLET ORAL at 18:18

## 2024-08-24 RX ADMIN — HYDROCHLOROTHIAZIDE 12.5 MG: 12.5 CAPSULE ORAL at 08:42

## 2024-08-24 ASSESSMENT — PAIN SCALES - GENERAL: PAINLEVEL_OUTOF10: 0

## 2024-08-24 NOTE — PROGRESS NOTES
Endovascular Neurosurgery Consult    Reason for evaluation: incidentally-discovered large L AVM  Referring Provider: Ana Ceballos MD     SUBJECTIVE:     No complaints today, states he is doing better. Family at bedside. All consented and are ready for the scheduled DSA.    History of Chief Complaint:    Nataliia Lao is a 84 y.o. male with pmh of ?DM on Metformin, HLD, taking Aspirin by pt's choice. Pt fell at home GLF and was admitted to the trauma team. CT brain incidentally discovered a large L AVM, which was confirmed on the CTA head. EVN consulted for AVM management.    Allergies  has No Known Allergies.  Medications  Prior to Admission medications    Medication Sig Start Date End Date Taking? Authorizing Provider   sennosides-docusate sodium (SENOKOT-S) 8.6-50 MG tablet Take 2 tablets by mouth daily   Yes Stacy Pathak MD   levothyroxine (SYNTHROID) 150 MCG tablet Take 1 tablet by mouth Daily   Yes Stacy Pathak MD   atorvastatin (LIPITOR) 20 MG tablet Take 1 tablet by mouth daily   Yes Stacy Pathak MD   metFORMIN (GLUCOPHAGE-XR) 500 MG extended release tablet Take 1 tablet by mouth daily (with breakfast)   Yes Stacy Pathak MD   felodipine (PLENDIL) 10 MG extended release tablet Take 1 tablet by mouth daily   Yes Stacy Pathak MD   metoprolol succinate (TOPROL XL) 25 MG extended release tablet Take 1 tablet by mouth daily   Yes Stacy Pathak MD   candesartan (ATACAND) 16 MG tablet Take 1 tablet by mouth daily   Yes Stacy Pathak MD   aspirin 81 MG EC tablet Take 1 tablet by mouth daily    Stacy Pathak MD    Scheduled Meds:   sennosides-docusate sodium  2 tablet Oral Dinner    [START ON 8/25/2024] famotidine  20 mg Oral Daily    labetalol  5 mg IntraVENous Once    sodium chloride flush  5-40 mL IntraVENous 2 times per day    polyethylene glycol  17 g Oral Daily    acetaminophen  1,000 mg Oral 3 times per day    levETIRAcetam   500 mg Oral BID    atorvastatin  20 mg Oral Nightly    levothyroxine  150 mcg Oral Daily    insulin lispro  0-16 Units SubCUTAneous Q4H     Continuous Infusions:   dextrose      sodium chloride 75 mL/hr at 08/24/24 0734    sodium chloride       PRN Meds:.hydrALAZINE, glucose, dextrose bolus **OR** dextrose bolus, glucagon (rDNA), dextrose, sodium chloride flush, sodium chloride, potassium chloride **OR** potassium chloride, magnesium sulfate, ondansetron **OR** ondansetron  Past Medical History   has a past medical history of Hyperlipidemia, Hypertension, and Thyroid disease.  Past Surgical History   has no past surgical history on file.  Social History   reports that he has quit smoking. His smoking use included cigarettes. He does not have any smokeless tobacco history on file.   has no history on file for alcohol use.   has no history on file for drug use.  Family History  family history is not on file.    Review of Systems:  CONSTITUTIONAL:  negative for fevers, chills, fatigue and malaise    EYES:  negative for double vision, blurred vision and photophobia     HEENT:  negative for tinnitus, epistaxis and sore throat    RESPIRATORY:  negative for cough, shortness of breath, wheezing    CARDIOVASCULAR:  negative for chest pain, palpitations, syncope, edema    GASTROINTESTINAL:  negative for nausea, vomiting    GENITOURINARY:  negative for incontinence    MUSCULOSKELETAL:  negative for neck or back pain    NEUROLOGICAL:  Negative for weakness and tingling  negative for headaches and dizziness    PSYCHIATRIC:  negative for anxiety      Review of systems otherwise negative.      OBJECTIVE:     Vitals:    08/24/24 1245   BP:    Pulse: 63   Resp:    Temp:    SpO2:         Gen: No acute distress  MS: Awake, Oriented x3, No obvious aphasia  CN: Pupils reactive, no gaze deviation, no gross facial droop, tongue midline  Motor: Moves all extremities antigravity. States L arm weakness that is related to shoulder pain, but  Pager [phone]  Electronically signed 08/24/24 at 1:45 PM  Stroke, Neurocritical Care & Neurointervention  [facility] Stroke [facility]

## 2024-08-24 NOTE — PROGRESS NOTES
ICU PROGRESS NOTE        PATIENT NAME: Nataliia Lao  MEDICAL RECORD NO. [id number]  DATE: 2024    PRIMARY CARE PHYSICIAN: No primary care provider on file.    HD: # 1    ASSESSMENT    Patient Active Problem List   Diagnosis    SAH (subarachnoid hemorrhage) (HCC)    Intracranial hemorrhage (HCC)       MEDICAL DECISION MAKING AND PLAN  Neuro:  GCS 15   Keppra bid  Continue neuro checks  Mri today for avm per NEVS  Sbp goal 100-140  Hold asa  CV  RRR  Home lipitor  Home valsartan HCTZ  Metoprolol held  Pulm  Room air  GI/Nutrition  Npo pending mri  Renal/lytes  UO excellent  Cr stable  Heme  DVT prophylaxis-held    7.   Endocrine        1. Glucose controlled  Home synthroid  Musculoskeletal  No frxs  Skin  No wounds  Micro  No fevers  Family/dispo  [facility]  Transfer to floor  Lines  piv     CHECKLIST    CAM-ICU RASS: 1  RESTRAINTS: none  IVF: NS at 74  NUTRITION: non  ANTIBIOTICS: none  GI: pepcid  DVT: held  GLYCEMIC CONTROL: none  HOB >45: yes  MOBILITY: with assistancce  SBT: n/a  IS: q2 while awake    Chief Complaint: \"I need my senna\"    SUBJECTIVE    Nataliia Lao  feels well slept well overnight. Requests his senna.       OBJECTIVE  VITALS: Temp: Temp: 98.2 °F (36.8 °C)Temp  Av.2 °F (36.8 °C)  Min: 98 °F (36.7 °C)  Max: 98.7 °F (37.1 °C) BP Systolic (24hrs), Av , Min:108 , Max:183   Diastolic (24hrs), Av, Min:44, Max:88   Pulse Pulse  Av.8  Min: 42  Max: 99 Resp Resp  Av.3  Min: 11  Max: 20 Pulse ox SpO2  Av.3 %  Min: 91 %  Max: 100 %    CONSTITUTIONAL: awake alert oriented  HEENT: mucosa moist  LUNGS: normal work of breathing  CV: RRR  GI: soft nontender, nondistended  MUSCULOSKELETAL: moves all extremities  NEUROLOGIC: gcs 15  SKIN: no wounds      LAB:  CBC:   Recent Labs     24  0645 24  0955 24  0354   WBC 4.7 6.5 3.4*   HGB 16.6 16.7 15.5   HCT 49.3 50.1 48.1   MCV 86.2 87.0 88.1   * 148 118*     BMP:   Recent Labs      08/23/24  0645 08/23/24  0955 08/24/24  0354    138 138   K 4.2 4.5 3.9    103 105   CO2 24 23 21   BUN 22 20 16   CREATININE 1.1 1.3* 1.2   GLUCOSE 121* 116* 91         RADIOLOGY:  CTA HEAD NECK W CONTRAST    Result Date: 8/23/2024  1. AVM along the left parietooccipital region with hypertrophy of the left posterior cerebral artery which is the main arterial vascular supply. 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 2. There is still a subdural hemorrhage along the right tentorium measuring 3 mm in thickness. Previous areas of subarachnoid hemorrhage along the right cerebral convexity have substantially improved. 3. There are chronic bilateral subdural hemorrhages versus hygromas with displacement of the blood vessels along the cerebral convexities, measuring 5 mm on the right and 4 mm on the left. 4. There are suspected stones in the submandibular glands without adjacent inflammatory changes. There may be small stones in the right submandibular duct. 5. Dental caries with periodontal disease.     CT HEAD WO CONTRAST    Result Date: 8/23/2024  1. AVM along the left parietooccipital region with hypertrophy of the left posterior cerebral artery which is the main arterial vascular supply. 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 2. There is still a subdural hemorrhage along the right tentorium measuring 3 mm in thickness. Previous areas of subarachnoid hemorrhage along the right cerebral convexity have substantially improved. 3. There are chronic bilateral subdural hemorrhages versus hygromas with displacement of the blood vessels along the cerebral convexities, measuring 5 mm on the right and 4 mm on the left. 4. There are suspected stones in the submandibular glands without adjacent inflammatory changes. There may be small stones in the right submandibular duct. 5. Dental caries with periodontal disease.

## 2024-08-24 NOTE — PLAN OF CARE
Problem: Discharge Planning  Goal: Discharge to home or other facility with appropriate resources  8/24/2024 0603 by Rupali Mireles RN  Outcome: Progressing  8/23/2024 1946 by Rosa Lee RN  Outcome: Progressing     Problem: Pain  Goal: Verbalizes/displays adequate comfort level or baseline comfort level  8/24/2024 0603 by Rupali Mireles RN  Outcome: Progressing  8/23/2024 1946 by Rosa Lee RN  Outcome: Progressing     Problem: Skin/Tissue Integrity  Goal: Absence of new skin breakdown  Description: 1.  Monitor for areas of redness and/or skin breakdown  2.  Assess vascular access sites hourly  3.  Every 4-6 hours minimum:  Change oxygen saturation probe site  4.  Every 4-6 hours:  If on nasal continuous positive airway pressure, respiratory therapy assess nares and determine need for appliance change or resting period.  8/24/2024 0603 by Rupali Mireles RN  Outcome: Progressing  8/23/2024 1946 by Rosa Lee RN  Outcome: Progressing     Problem: Safety - Adult  Goal: Free from fall injury  8/24/2024 0603 by Rupali Mireles RN  Outcome: Progressing  8/23/2024 1946 by Rosa Lee RN  Outcome: Progressing     Problem: ABCDS Injury Assessment  Goal: Absence of physical injury  8/24/2024 0603 by Rupali Mireles RN  Outcome: Progressing  8/23/2024 1946 by Rosa Lee RN  Outcome: Progressing

## 2024-08-24 NOTE — PROGRESS NOTES
Discharge instructions reviewed with pt, verbalized understanding. All questions answered to the best of knowledge.

## 2024-08-24 NOTE — PROGRESS NOTES
Pharmacy Note     Renal Dose Adjustment    Nataliia Lao is a 84 y.o. male. Pharmacist assessment of renally cleared medications.    Recent Labs     08/23/24  0955 08/24/24  0354   BUN 20 16       Recent Labs     08/23/24  0955 08/24/24  0354   CREATININE 1.3* 1.2       Estimated Creatinine Clearance: 42 mL/min (based on SCr of 1.2 mg/dL).    Height:   Ht Readings from Last 1 Encounters:   08/24/24 1.702 m (5' 7\")     Weight:  Wt Readings from Last 1 Encounters:   08/24/24 68 kg (149 lb 14.6 oz)       The following medication dose has been adjusted based upon renal function per P&T Guidelines:             Famotidine 20 mg BID --> Famotidine 20 mg daily     Juan C Valentino, PharmD, [facility] 8/24/2024 12:21 PM

## 2024-08-24 NOTE — PROGRESS NOTES
Discussed with Dr. Smith that patient is asymptomatic but HR has been running in mid 40s for approx last hour consistently. Patient is alert & orient and denies any dizziness, fatigue, sob, confusion, or chest pain at this time. EKG also completed and showing sinus bradycardia. See MAR hold order. Continue to monitor.

## 2024-08-24 NOTE — PROGRESS NOTES
Trauma Tertiary Survey    Admit Date: 8/23/2024  Hospital day 1    Fall distance 4 feet       Past Medical History:   Diagnosis Date    Hyperlipidemia     Hypertension     Thyroid disease        Scheduled Meds:   sennosides-docusate sodium  2 tablet Oral Dinner    [START ON 8/25/2024] famotidine  20 mg Oral Daily    labetalol  5 mg IntraVENous Once    sodium chloride flush  5-40 mL IntraVENous 2 times per day    polyethylene glycol  17 g Oral Daily    acetaminophen  1,000 mg Oral 3 times per day    levETIRAcetam  500 mg Oral BID    atorvastatin  20 mg Oral Nightly    levothyroxine  150 mcg Oral Daily    insulin lispro  0-16 Units SubCUTAneous Q4H     Continuous Infusions:   dextrose      sodium chloride 75 mL/hr at 08/24/24 0734    sodium chloride       PRN Meds:hydrALAZINE, glucose, dextrose bolus **OR** dextrose bolus, glucagon (rDNA), dextrose, sodium chloride flush, sodium chloride, potassium chloride **OR** potassium chloride, magnesium sulfate, ondansetron **OR** ondansetron    Subjective:     Patient has complaints mild pain.  Pain is mild, worsens with touch and excessive lighting, and some relief by rest.  There is not associated numbness, tingling, weakness.    Objective:   Patient Vitals for the past 8 hrs:   BP Temp Temp src Pulse Resp SpO2   08/24/24 1245 -- -- -- 63 -- --   08/24/24 1244 -- -- -- 58 -- --   08/24/24 1236 (!) 152/71 98.3 °F (36.8 °C) Oral 66 18 97 %   08/24/24 1000 (!) 128/53 -- -- (!) 49 16 --   08/24/24 0900 (!) 146/52 -- -- 55 19 --   08/24/24 0600 (!) 141/51 98.2 °F (36.8 °C) Oral 52 20 94 %       I/O last 3 completed shifts:  In: 255.3 [I.V.:255.3]  Out: 1075 [Urine:1075]  I/O this shift:  In: 1103.9 [I.V.:1103.9]  Out: 325 [Urine:325]    Radiology:perfromed    PHYSICAL EXAM:   GCS:  4 - Opens eyes on own   6 - Follows simple motor commands  5 - Alert and oriented    Pupil size:  Left 4 mm Right 4 mm  Pupil reaction: Yes  Wiggles fingers: Left Yes Right Yes  Hand grasp:

## 2024-08-24 NOTE — PLAN OF CARE
Problem: Discharge Planning  Goal: Discharge to home or other facility with appropriate resources  8/24/2024 1413 by Makayla Vyas RN  Outcome: Completed  8/24/2024 0603 by Rupali iMreles RN  Outcome: Progressing     Problem: Pain  Goal: Verbalizes/displays adequate comfort level or baseline comfort level  8/24/2024 1413 by Makayla Vyas RN  Outcome: Completed  8/24/2024 0603 by Rupali Mireles RN  Outcome: Progressing     Problem: Skin/Tissue Integrity  Goal: Absence of new skin breakdown  Description: 1.  Monitor for areas of redness and/or skin breakdown  2.  Assess vascular access sites hourly  3.  Every 4-6 hours minimum:  Change oxygen saturation probe site  4.  Every 4-6 hours:  If on nasal continuous positive airway pressure, respiratory therapy assess nares and determine need for appliance change or resting period.  8/24/2024 1413 by Makayla Vyas RN  Outcome: Completed  8/24/2024 0603 by Rupali Mireles RN  Outcome: Progressing     Problem: Safety - Adult  Goal: Free from fall injury  8/24/2024 1413 by Makayla Vyas RN  Outcome: Completed  8/24/2024 0603 by Rupali Mireles RN  Outcome: Progressing     Problem: ABCDS Injury Assessment  Goal: Absence of physical injury  8/24/2024 1413 by Makayla Vyas RN  Outcome: Completed  8/24/2024 0603 by Rupali Mireles RN  Outcome: Progressing

## 2024-08-24 NOTE — PROGRESS NOTES
Neurosurgery GARY/Resident    Daily Progress Note   Chief Complaint   Patient presents with    Fall     8/24/2024  3:36 PM    Chart reviewed.  No acute events overnight.  No new complaints. MRI reviewed with patient and family. EEG pending. Requested a syncopal work up which has not been ordered. Patient pending DSA. Per family, if DSA will not be until Monday, they would like to go home and come back for elective DSA outpatient. Will reach out to MARIA TERESA and trauma to verify plan.     Vitals:    08/24/24 1000 08/24/24 1236 08/24/24 1244 08/24/24 1245   BP: (!) 128/53 (!) 152/71     Pulse: (!) 49 66 58 63   Resp: 16 18     Temp:  98.3 °F (36.8 °C)     TempSrc:  Oral     SpO2:  97%     Weight:       Height:             PE:   AOx3   CNII-XII intact   PERRL, EOMI   Motor   L deltoid 5/5; R deltoid 5/5  L biceps 5/5; R biceps 5/5  L triceps 5/5; R triceps 5/5  L wrist extension 5/5; R wrist extension 5/5  L intrinsics 5/5; R intrinsics 5/5      L iliopsoas 5/5 , R iliopsoas 5/5  L quadriceps 5/5; R quadriceps 5/5  L Dorsiflexion 5/5; R dorsiflexion 5/5  L Plantarflexion 5/5; R plantarflexion 5/5  L EHL 5/5; R EHL 5/5    Sensation intact      Lab Results   Component Value Date    WBC 3.4 (L) 08/24/2024    HGB 15.5 08/24/2024    HCT 48.1 08/24/2024     (L) 08/24/2024    ALT 22 08/23/2024    AST 20 08/23/2024     08/24/2024    K 3.9 08/24/2024     08/24/2024    CREATININE 1.2 08/24/2024    BUN 16 08/24/2024    CO2 21 08/24/2024    TSH 0.02 (L) 08/23/2024    INR 1.1 08/23/2024    LABA1C 6.0 08/23/2024       Radiology   MRI BRAIN W WO CONTRAST    Result Date: 8/24/2024  EXAMINATION: MRI OF THE BRAIN WITHOUT AND WITH CONTRAST  8/24/2024 11:56 am TECHNIQUE: Multiplanar multisequence MRI of the head/brain was performed without and with the administration of intravenous contrast. COMPARISON: CT head/CTA head 08/23/2024 HISTORY: ORDERING SYSTEM PROVIDED HISTORY: left occipital lobe mass, fall with SAH presumed  weight based adjustment of the mA/kV was utilized to reduce the radiation dose to as low as reasonably achievable.; CT of the cervical spine was performed without the administration of intravenous contrast. Multiplanar reformatted images are provided for review. Automated exposure control, iterative reconstruction, and/or weight based adjustment of the mA/kV was utilized to reduce the radiation dose to as low as reasonably achievable.; CT of the face was performed without the administration of intravenous contrast. Multiplanar reformatted images are provided for review. Automated exposure control, iterative reconstruction, and/or weight based adjustment of the mA/kV was utilized to reduce the radiation dose to as low as reasonably achievable. COMPARISON: None. HISTORY: ORDERING SYSTEM PROVIDED HISTORY: Trauma TECHNOLOGIST PROVIDED HISTORY: Trauma Decision Support Exception - unselect if not a suspected or confirmed emergency medical condition->Emergency Medical Condition (MA) Reason for Exam: fall; left sided rib, facial pain. FINDINGS: HEAD: BRAIN/VENTRICLES: Ventricles normal in size and location.  Mild prominence of the bilateral frontoparietal subdural spaces.  Superimposed on this, there is hyperdense small collection of the 4 mm thickness in the right frontoparietal region at the level of the sylvian fissure compatible with acute subdural hemorrhage. Further inferiorly there is subdural blood tracking along the right tentorium and there is also subarachnoid hemorrhage manifested by sulcal hyperattenuation in the right temporal lobe. There is left occipital lobe mass 2.1 x 1.9 x 1.7 cm which is moderately hyperdense and demonstrates several calcifications in its periphery.  There is dilated tortuous vessel up to about 8 mm thickness extending from this mass cephalad brain cortex into the superior sagittal sinus region posteriorly.  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 Further evaluation  on this, there is hyperdense small collection of the 4 mm thickness in the right frontoparietal region at the level of the sylvian fissure compatible with acute subdural hemorrhage. Further inferiorly there is subdural blood tracking along the right tentorium and there is also subarachnoid hemorrhage manifested by sulcal hyperattenuation in the right temporal lobe. There is left occipital lobe mass 2.1 x 1.9 x 1.7 cm which is moderately hyperdense and demonstrates several calcifications in its periphery.  There is dilated tortuous vessel up to about 8 mm thickness extending from this mass cephalad brain cortex into the superior sagittal sinus region posteriorly.  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 Further evaluation with CT angiogram is recommended. ORBITS: The visualized portion of the orbits demonstrate no acute abnormality. SINUSES: Right maxillary sinus polyp or mucous retention cyst 2.5 cm. Mastoid air cells demonstrate no acute abnormality. SOFT TISSUES/SKULL: No acute abnormality of the visualized skull or soft tissues. CT MAXILLOFACIAL: FACIAL BONES: The maxilla, pterygoid plates and zygomatic arches are intact. There is increase lucency around the roots of tooth 21 and 24.  Possible loose tooth or infection.  Please correlate clinically.  The mandible is intact.  The mandibular condyles are normally situated.  The nasal bones and maxillary nasal processes are intact. ORBITS:  The globes appear intact.  The extraocular muscles, optic nerve sheath complexes and lacrimal glands appear unremarkable.  No retrobulbar hematoma or mass is seen.  The orbital walls and rims are intact. SINUSES/MASTOIDS: The paranasal sinuses and mastoid air cells are well aerated.  2.5 cm right maxillary sinus polyp or mucous retention cyst.  No acute fracture is seen. SOFT TISSUES: No appreciable facial soft tissue swelling is seen. CERVICAL SPINE: BONES/ALIGNMENT: There is no evidence of an acute cervical

## 2024-08-24 NOTE — PROGRESS NOTES
POST ICU TRANSFER NOTE    SUBJECTIVE  Pt AAOX4. No complains at this time. Neuroendovascular team at bedside with updates from MRI findings and plan. Family at bedside.    Checklist:  [x]   Oxygen saturation is > 90% on FiO2< 50% (exceptions may be made for patient pathophysiology)    [x]   Vital signs remain at or near baseline without pharmaceutical adjuncts, blood products, or > 2L fluid bolus since transfer   [x]   No suspicion or evidence of a new untreated infection (confusion, cool or cyanotic extremities, poor capillary refill, metabolic acidosis, low urine output)  [x]   Stable GCS, seizures controlled, no invasive neurological monitoring   [x]   No alteration in mental status after transfer from ICU  [x]   No deterioration in renal function since transfer  [x]   Patient care needs do not exceed the capabilities of the unit they were transferred to (suctioning needs, glucose monitoring, neurological monitoring, neurovascular checks, vital sign monitoring, I&O monitoring, drain management        Alex Stoddard MD  Trauma/Surgery Service  8/24/2024 at 1:18 PM

## 2024-08-25 PROBLEM — R55 SYNCOPE: Status: ACTIVE | Noted: 2024-08-25

## 2024-08-25 LAB
ANION GAP SERPL CALCULATED.3IONS-SCNC: 10 MMOL/L (ref 9–16)
BASOPHILS # BLD: <0.03 K/UL (ref 0–0.2)
BASOPHILS NFR BLD: 0 % (ref 0–2)
BUN SERPL-MCNC: 21 MG/DL (ref 8–23)
CALCIUM SERPL-MCNC: 9 MG/DL (ref 8.6–10.4)
CHLORIDE SERPL-SCNC: 104 MMOL/L (ref 98–107)
CO2 SERPL-SCNC: 25 MMOL/L (ref 20–31)
CREAT SERPL-MCNC: 1.2 MG/DL (ref 0.7–1.2)
EKG ATRIAL RATE: 50 BPM
EKG P AXIS: 44 DEGREES
EKG P-R INTERVAL: 178 MS
EKG Q-T INTERVAL: 442 MS
EKG QRS DURATION: 92 MS
EKG QTC CALCULATION (BAZETT): 402 MS
EKG R AXIS: 6 DEGREES
EKG T AXIS: 35 DEGREES
EKG VENTRICULAR RATE: 50 BPM
EOSINOPHIL # BLD: 0.12 K/UL (ref 0–0.44)
EOSINOPHILS RELATIVE PERCENT: 3 % (ref 1–4)
ERYTHROCYTE [DISTWIDTH] IN BLOOD BY AUTOMATED COUNT: 12.9 % (ref 11.8–14.4)
GFR, ESTIMATED: 62 ML/MIN/1.73M2
GLUCOSE BLD-MCNC: 134 MG/DL (ref 75–110)
GLUCOSE BLD-MCNC: 144 MG/DL (ref 75–110)
GLUCOSE BLD-MCNC: 169 MG/DL (ref 75–110)
GLUCOSE SERPL-MCNC: 78 MG/DL (ref 74–99)
HCT VFR BLD AUTO: 45 % (ref 40.7–50.3)
HGB BLD-MCNC: 14.8 G/DL (ref 13–17)
IMM GRANULOCYTES # BLD AUTO: <0.03 K/UL (ref 0–0.3)
IMM GRANULOCYTES NFR BLD: 0 %
LYMPHOCYTES NFR BLD: 1.37 K/UL (ref 1.1–3.7)
LYMPHOCYTES RELATIVE PERCENT: 37 % (ref 24–43)
MCH RBC QN AUTO: 28.3 PG (ref 25.2–33.5)
MCHC RBC AUTO-ENTMCNC: 32.9 G/DL (ref 28.4–34.8)
MCV RBC AUTO: 86 FL (ref 82.6–102.9)
MONOCYTES NFR BLD: 0.43 K/UL (ref 0.1–1.2)
MONOCYTES NFR BLD: 12 % (ref 3–12)
NEUTROPHILS NFR BLD: 48 % (ref 36–65)
NEUTS SEG NFR BLD: 1.77 K/UL (ref 1.5–8.1)
NRBC BLD-RTO: 0 PER 100 WBC
PLATELET # BLD AUTO: 116 K/UL (ref 138–453)
PMV BLD AUTO: 10.7 FL (ref 8.1–13.5)
POTASSIUM SERPL-SCNC: 3.8 MMOL/L (ref 3.7–5.3)
RBC # BLD AUTO: 5.23 M/UL (ref 4.21–5.77)
SODIUM SERPL-SCNC: 139 MMOL/L (ref 136–145)
WBC OTHER # BLD: 3.7 K/UL (ref 3.5–11.3)

## 2024-08-25 PROCEDURE — 6370000000 HC RX 637 (ALT 250 FOR IP): Performed by: STUDENT IN AN ORGANIZED HEALTH CARE EDUCATION/TRAINING PROGRAM

## 2024-08-25 PROCEDURE — 6360000002 HC RX W HCPCS

## 2024-08-25 PROCEDURE — 85025 COMPLETE CBC W/AUTO DIFF WBC: CPT

## 2024-08-25 PROCEDURE — 82947 ASSAY GLUCOSE BLOOD QUANT: CPT

## 2024-08-25 PROCEDURE — 99232 SBSQ HOSP IP/OBS MODERATE 35: CPT | Performed by: SURGERY

## 2024-08-25 PROCEDURE — 80048 BASIC METABOLIC PNL TOTAL CA: CPT

## 2024-08-25 PROCEDURE — 99222 1ST HOSP IP/OBS MODERATE 55: CPT | Performed by: INTERNAL MEDICINE

## 2024-08-25 PROCEDURE — 93005 ELECTROCARDIOGRAM TRACING: CPT | Performed by: STUDENT IN AN ORGANIZED HEALTH CARE EDUCATION/TRAINING PROGRAM

## 2024-08-25 PROCEDURE — 2580000003 HC RX 258: Performed by: STUDENT IN AN ORGANIZED HEALTH CARE EDUCATION/TRAINING PROGRAM

## 2024-08-25 PROCEDURE — 36415 COLL VENOUS BLD VENIPUNCTURE: CPT

## 2024-08-25 PROCEDURE — 94761 N-INVAS EAR/PLS OXIMETRY MLT: CPT

## 2024-08-25 PROCEDURE — 2000000000 HC ICU R&B

## 2024-08-25 PROCEDURE — 99233 SBSQ HOSP IP/OBS HIGH 50: CPT | Performed by: PSYCHIATRY & NEUROLOGY

## 2024-08-25 PROCEDURE — 2060000000 HC ICU INTERMEDIATE R&B

## 2024-08-25 RX ADMIN — ATORVASTATIN CALCIUM 20 MG: 20 TABLET, FILM COATED ORAL at 20:45

## 2024-08-25 RX ADMIN — LEVETIRACETAM 500 MG: 500 TABLET, FILM COATED ORAL at 20:45

## 2024-08-25 RX ADMIN — HYDRALAZINE HYDROCHLORIDE 10 MG: 20 INJECTION INTRAMUSCULAR; INTRAVENOUS at 20:38

## 2024-08-25 RX ADMIN — LEVETIRACETAM 500 MG: 500 TABLET, FILM COATED ORAL at 08:32

## 2024-08-25 RX ADMIN — LEVOTHYROXINE SODIUM 150 MCG: 75 TABLET ORAL at 08:32

## 2024-08-25 RX ADMIN — SODIUM CHLORIDE: 9 INJECTION, SOLUTION INTRAVENOUS at 02:02

## 2024-08-25 RX ADMIN — HYDRALAZINE HYDROCHLORIDE 10 MG: 20 INJECTION INTRAMUSCULAR; INTRAVENOUS at 08:32

## 2024-08-25 RX ADMIN — SODIUM CHLORIDE, PRESERVATIVE FREE 5 ML: 5 INJECTION INTRAVENOUS at 20:45

## 2024-08-25 NOTE — PLAN OF CARE
Problem: Skin/Tissue Integrity - Adult  Goal: Skin integrity remains intact  8/25/2024 1031 by Clare Mendoza RN  Outcome: Progressing

## 2024-08-25 NOTE — CONSULTS
[facility]    Consult / H&P               Today's Date: 8/25/2024  Patient Name: Nataliia Lao  Date of admission: 8/23/2024  9:42 AM  Patient's age: 84 y.o., 1940  Admission Dx: SAH (subarachnoid hemorrhage) (HCC) [I60.9]    Requesting Physician: Ana Ceballos MD    Cardiac Evaluation Reason: Syncope    History Obtained From: patient/chart review     History of Present Illness:    This patient 84 y.o. years old male with past medical history as below.     Patient with PMH of HTN, HLD, hypothyroidism and AVMs presented to ER after syncopal episode at home.  Reportedly patient had loss of consciousness with confusion after the fall.  Patient's grandson present at bedside denies any previous history of falls or syncope.  He denies patient mentioning any symptoms of chest pain, shortness of breath, dizziness or lightheadedness.  Patient takes aspirin at home.    On initial evaluation patient was hypertensive with systolic blood pressure in 160s, ecchymosis around the left eye.  Workup showed subdural and subarachnoid hemorrhage and left occipital AVM.  MRI showed edema adjacent to AVM.  Patient is thought to have a possible seizure secondary to AVM.  He was evaluated by NSG and neuroendovascular.  Plan is for potential DSA with intervention.    Cardiology is consulted for evaluation of syncope.  Per chart review and collateral history patient has no previous history of syncopal episodes.  Family reports him being started on metoprolol recently and are concerned about drop in blood pressure.  He was taking Toprol-XL 25, candesartan 16, aspirin.  High-sensitivity troponin mildly elevated 25  > 35  > 29.  No previous cardiac workup available and an echocardiogram has been ordered.    Past Medica T2DM l History:   has a past medical history of Hyperlipidemia, Hypertension, and Thyroid disease.    Past Surgical History:   has no past surgical history on file.     Home Medications:

## 2024-08-25 NOTE — PROGRESS NOTES
Endovascular Neurosurgery Consult    Reason for evaluation: incidentally-discovered large L AVM  Referring Provider: Ana Ceballos MD     SUBJECTIVE:     NAEO from EVN perspective.    History of Chief Complaint:    Nataliia Lao is a 84 y.o. male with pmh of ?DM on Metformin, HLD, taking Aspirin by pt's choice. Pt fell at home GLF and was admitted to the trauma team. CT brain incidentally discovered a large L AVM, which was confirmed on the CTA head. EVN consulted for AVM management.    Allergies  has No Known Allergies.  Medications  Prior to Admission medications    Medication Sig Start Date End Date Taking? Authorizing Provider   sennosides-docusate sodium (SENOKOT-S) 8.6-50 MG tablet Take 2 tablets by mouth daily   Yes Stacy Pathak MD   levothyroxine (SYNTHROID) 150 MCG tablet Take 1 tablet by mouth Daily   Yes Stacy Pathak MD   atorvastatin (LIPITOR) 20 MG tablet Take 1 tablet by mouth daily   Yes Stacy Pathak MD   metFORMIN (GLUCOPHAGE-XR) 500 MG extended release tablet Take 1 tablet by mouth daily (with breakfast)   Yes ProviderStacy MD   felodipine (PLENDIL) 10 MG extended release tablet Take 1 tablet by mouth daily   Yes Stacy Pathak MD   metoprolol succinate (TOPROL XL) 25 MG extended release tablet Take 1 tablet by mouth daily   Yes ProviderStacy MD   candesartan (ATACAND) 16 MG tablet Take 1 tablet by mouth daily   Yes ProviderStacy MD   aspirin 81 MG EC tablet Take 1 tablet by mouth daily    ProviderStacy MD    Scheduled Meds:   sennosides-docusate sodium  2 tablet Oral Dinner    famotidine  20 mg Oral Daily    insulin lispro  0-16 Units SubCUTAneous 4x Daily AC & HS    labetalol  5 mg IntraVENous Once    sodium chloride flush  5-40 mL IntraVENous 2 times per day    polyethylene glycol  17 g Oral Daily    acetaminophen  1,000 mg Oral 3 times per day    levETIRAcetam  500 mg Oral BID    atorvastatin  20 mg Oral Nightly     levothyroxine  150 mcg Oral Daily     Continuous Infusions:   dextrose      sodium chloride 75 mL/hr at 08/25/24 0202    sodium chloride       PRN Meds:.hydrALAZINE, glucose, dextrose bolus **OR** dextrose bolus, glucagon (rDNA), dextrose, sodium chloride flush, sodium chloride, potassium chloride **OR** potassium chloride, magnesium sulfate, ondansetron **OR** ondansetron  Past Medical History   has a past medical history of Hyperlipidemia, Hypertension, and Thyroid disease.  Past Surgical History   has no past surgical history on file.  Social History   reports that he has quit smoking. His smoking use included cigarettes. He does not have any smokeless tobacco history on file.   has no history on file for alcohol use.   has no history on file for drug use.  Family History  family history is not on file.    Review of Systems:  CONSTITUTIONAL:  negative for fevers, chills, fatigue and malaise    EYES:  negative for double vision, blurred vision and photophobia     HEENT:  negative for tinnitus, epistaxis and sore throat    RESPIRATORY:  negative for cough, shortness of breath, wheezing    CARDIOVASCULAR:  negative for chest pain, palpitations, syncope, edema    GASTROINTESTINAL:  negative for nausea, vomiting    GENITOURINARY:  negative for incontinence    MUSCULOSKELETAL:  negative for neck or back pain    NEUROLOGICAL:  Negative for weakness and tingling  negative for headaches and dizziness    PSYCHIATRIC:  negative for anxiety      Review of systems otherwise negative.      OBJECTIVE:     Vitals:    08/25/24 1200   BP: (!) 134/52   Pulse: 70   Resp: 22   Temp: 98.7 °F (37.1 °C)   SpO2: 95%        Gen: No acute distress  MS: Awake, Oriented x3, No obvious aphasia  CN: Pupils reactive, no gaze deviation, no gross facial droop, tongue midline  Motor: Moves all extremities antigravity. States L arm weakness that is related to shoulder pain, but otherwise 5/5.   Sens: Responds to LT in all extremities  Gait:

## 2024-08-25 NOTE — PLAN OF CARE
Problem: Neurosensory - Adult  Goal: Achieves stable or improved neurological status  Outcome: Progressing  Goal: Absence of seizures  Outcome: Progressing  Goal: Remains free of injury related to seizures activity  Outcome: Progressing  Goal: Achieves maximal functionality and self care  Outcome: Progressing     Problem: Cardiovascular - Adult  Goal: Maintains optimal cardiac output and hemodynamic stability  Outcome: Progressing  Goal: Absence of cardiac dysrhythmias or at baseline  Outcome: Progressing     Problem: Skin/Tissue Integrity - Adult  Goal: Skin integrity remains intact  Outcome: Progressing  Goal: Incisions, wounds, or drain sites healing without S/S of infection  Outcome: Progressing  Goal: Oral mucous membranes remain intact  Outcome: Progressing     Problem: Musculoskeletal - Adult  Goal: Return mobility to safest level of function  Outcome: Progressing  Goal: Maintain proper alignment of affected body part  Outcome: Progressing  Goal: Return ADL status to a safe level of function  Outcome: Progressing

## 2024-08-25 NOTE — PROGRESS NOTES
PROGRESS NOTE          PATIENT NAME: Nataliia Lao  MEDICAL RECORD NO. [id number]  DATE: 2024  SURGEON: Ana Ceballos MD  PRIMARY CARE PHYSICIAN: No primary care provider on file.    HD: # 2    ASSESSMENT    Patient Active Problem List   Diagnosis    SAH (subarachnoid hemorrhage) (HCC)    Intracranial hemorrhage (HCC)    AVM (arteriovenous malformation) brain       MEDICAL DECISION MAKING AND PLAN    Diagnosis: SDH/SAH BIG 3              Plan: NS following              rCTH stable              Stepdown from TICU    Diagnosis: Left occipital lobe AVM (2.2 cm)  Plan: NEV consulted - DSA   MRI brain       SUBJECTIVE    Nataliia Lao evaluated this morning. AAOX4 GCS 15. Eating well. DVT Ppx still on hold per NS, encouraging ambulation/PT/OT as much as possible. No bedrest restrictions.      OBJECTIVE  VITALS: Temp: Temp: 98.9 °F (37.2 °C)Temp  Av.2 °F (36.8 °C)  Min: 97.9 °F (36.6 °C)  Max: 98.9 °F (37.2 °C) BP Systolic (24hrs), Av , Min:98 , Max:164   Diastolic (24hrs), Av, Min:31, Max:111   Pulse Pulse  Av.2  Min: 48  Max: 75 Resp Resp  Av.2  Min: 14  Max: 21 Pulse ox SpO2  Av.8 %  Min: 96 %  Max: 98 %  Constitutional:       Appearance: Normal appearance.   HENT:      Head: Normocephalic and atraumatic.      Right Ear: External ear normal.      Left Ear: External ear normal.      Nose: Nose normal.      Mouth/Throat:      Mouth: Mucous membranes are moist.   Eyes:      Conjunctiva/sclera: L subconjunctival hemorrhage.      Pupils: Pupils are equal, round, and reactive to light.   Cardiovascular:      Rate and Rhythm: Normal rate and regular rhythm.   Pulmonary:      Effort: Pulmonary effort is normal.      Breath sounds: Normal breath sounds.   Abdominal:      Palpations: Abdomen is soft.   Musculoskeletal:         General: Swelling and tenderness present. No deformity.      Cervical back: Normal range of motion. No rigidity or tenderness.      Right lower

## 2024-08-26 ENCOUNTER — APPOINTMENT (OUTPATIENT)
Dept: INTERVENTIONAL RADIOLOGY/VASCULAR | Age: 84
DRG: 055 | End: 2024-08-26
Payer: MEDICAID

## 2024-08-26 ENCOUNTER — APPOINTMENT (OUTPATIENT)
Age: 84
DRG: 055 | End: 2024-08-26
Payer: MEDICAID

## 2024-08-26 LAB
ANION GAP SERPL CALCULATED.3IONS-SCNC: 10 MMOL/L (ref 9–16)
BASOPHILS # BLD: <0.03 K/UL (ref 0–0.2)
BASOPHILS NFR BLD: 0 % (ref 0–2)
BUN SERPL-MCNC: 14 MG/DL (ref 8–23)
CALCIUM SERPL-MCNC: 8.8 MG/DL (ref 8.6–10.4)
CHLORIDE SERPL-SCNC: 107 MMOL/L (ref 98–107)
CO2 SERPL-SCNC: 23 MMOL/L (ref 20–31)
CREAT SERPL-MCNC: 1 MG/DL (ref 0.7–1.2)
ECHO AO ROOT DIAM: 3 CM
ECHO AO ROOT INDEX: 1.68 CM/M2
ECHO AV AREA PEAK VELOCITY: 1.2 CM2
ECHO AV AREA VTI: 1.1 CM2
ECHO AV AREA/BSA PEAK VELOCITY: 0.7 CM2/M2
ECHO AV AREA/BSA VTI: 0.6 CM2/M2
ECHO AV MEAN GRADIENT: 10 MMHG
ECHO AV MEAN VELOCITY: 1.4 M/S
ECHO AV MEAN VELOCITY: 1.4 M/S
ECHO AV PEAK GRADIENT: 20 MMHG
ECHO AV PEAK VELOCITY: 2.3 M/S
ECHO AV VTI: 49.7 CM
ECHO BSA: 1.79 M2
ECHO LA AREA 2C: 18.5 CM2
ECHO LA AREA 4C: 19.1 CM2
ECHO LA DIAMETER INDEX: 2.35 CM/M2
ECHO LA DIAMETER: 4.2 CM
ECHO LA MAJOR AXIS: 5.5 CM
ECHO LA MINOR AXIS: 5.2 CM
ECHO LA TO AORTIC ROOT RATIO: 1.4
ECHO LA VOL BP: 56 ML (ref 18–58)
ECHO LA VOL MOD A2C: 54 ML (ref 18–58)
ECHO LA VOL MOD A4C: 55 ML (ref 18–58)
ECHO LA VOL/BSA BIPLANE: 31 ML/M2 (ref 16–34)
ECHO LA VOLUME INDEX MOD A2C: 30 ML/M2 (ref 16–34)
ECHO LA VOLUME INDEX MOD A4C: 31 ML/M2 (ref 16–34)
ECHO LV E' LATERAL VELOCITY: 7 CM/S
ECHO LV E' SEPTAL VELOCITY: 7 CM/S
ECHO LV EDV A2C: 44 ML
ECHO LV EDV A4C: 64 ML
ECHO LV EDV INDEX A4C: 36 ML/M2
ECHO LV EDV NDEX A2C: 25 ML/M2
ECHO LV EJECTION FRACTION A2C: 61 %
ECHO LV EJECTION FRACTION A4C: 67 %
ECHO LV EJECTION FRACTION BIPLANE: 65 % (ref 55–100)
ECHO LV ESV A2C: 17 ML
ECHO LV ESV A4C: 21 ML
ECHO LV ESV INDEX A2C: 9 ML/M2
ECHO LV ESV INDEX A4C: 12 ML/M2
ECHO LV FRACTIONAL SHORTENING: 51 % (ref 28–44)
ECHO LV INTERNAL DIMENSION DIASTOLE INDEX: 2.63 CM/M2
ECHO LV INTERNAL DIMENSION DIASTOLIC: 4.7 CM (ref 4.2–5.9)
ECHO LV INTERNAL DIMENSION SYSTOLIC INDEX: 1.28 CM/M2
ECHO LV INTERNAL DIMENSION SYSTOLIC: 2.3 CM
ECHO LV IVSD: 1.1 CM (ref 0.6–1)
ECHO LV MASS 2D: 164.5 G (ref 88–224)
ECHO LV MASS INDEX 2D: 91.9 G/M2 (ref 49–115)
ECHO LV POSTERIOR WALL DIASTOLIC: 0.9 CM (ref 0.6–1)
ECHO LV RELATIVE WALL THICKNESS RATIO: 0.38
ECHO LVOT AREA: 2.5 CM2
ECHO LVOT AV VTI INDEX: 0.44
ECHO LVOT DIAM: 1.8 CM
ECHO LVOT MEAN GRADIENT: 2 MMHG
ECHO LVOT PEAK GRADIENT: 5 MMHG
ECHO LVOT PEAK VELOCITY: 1.1 M/S
ECHO LVOT STROKE VOLUME INDEX: 31.4 ML/M2
ECHO LVOT SV: 56.2 ML
ECHO LVOT VTI: 22.1 CM
ECHO MV A VELOCITY: 0.99 M/S
ECHO MV AREA VTI: 2 CM2
ECHO MV E DECELERATION TIME (DT): 171 MS
ECHO MV E VELOCITY: 0.74 M/S
ECHO MV E/A RATIO: 0.75
ECHO MV E/E' LATERAL: 10.57
ECHO MV E/E' RATIO (AVERAGED): 10.57
ECHO MV E/E' SEPTAL: 10.57
ECHO MV LVOT VTI INDEX: 1.28
ECHO MV MAX VELOCITY: 1 M/S
ECHO MV MEAN GRADIENT: 1 MMHG
ECHO MV MEAN VELOCITY: 0.5 M/S
ECHO MV PEAK GRADIENT: 4 MMHG
ECHO MV VTI: 28.2 CM
ECHO PV MAX VELOCITY: 1 M/S
ECHO PV PEAK GRADIENT: 4 MMHG
ECHO RA AREA 4C: 13.1 CM2
ECHO RA END SYSTOLIC VOLUME APICAL 4 CHAMBER INDEX BSA: 17 ML/M2
ECHO RA VOLUME: 31 ML
ECHO RV BASAL DIMENSION: 3.6 CM
ECHO RV FREE WALL PEAK S': 10 CM/S
ECHO RV TAPSE: 2.2 CM (ref 1.7–?)
EKG ATRIAL RATE: 52 BPM
EKG P AXIS: 65 DEGREES
EKG P-R INTERVAL: 172 MS
EKG Q-T INTERVAL: 438 MS
EKG QRS DURATION: 90 MS
EKG QTC CALCULATION (BAZETT): 407 MS
EKG R AXIS: 20 DEGREES
EKG T AXIS: 49 DEGREES
EKG VENTRICULAR RATE: 52 BPM
EOSINOPHIL # BLD: 0.1 K/UL (ref 0–0.44)
EOSINOPHILS RELATIVE PERCENT: 3 % (ref 1–4)
ERYTHROCYTE [DISTWIDTH] IN BLOOD BY AUTOMATED COUNT: 13 % (ref 11.8–14.4)
GFR, ESTIMATED: 78 ML/MIN/1.73M2
GLUCOSE BLD-MCNC: 113 MG/DL (ref 75–110)
GLUCOSE BLD-MCNC: 115 MG/DL (ref 75–110)
GLUCOSE BLD-MCNC: 161 MG/DL (ref 75–110)
GLUCOSE BLD-MCNC: 184 MG/DL (ref 75–110)
GLUCOSE SERPL-MCNC: 110 MG/DL (ref 74–99)
HCT VFR BLD AUTO: 46.1 % (ref 40.7–50.3)
HGB BLD-MCNC: 15.3 G/DL (ref 13–17)
IMM GRANULOCYTES # BLD AUTO: <0.03 K/UL (ref 0–0.3)
IMM GRANULOCYTES NFR BLD: 0 %
LYMPHOCYTES NFR BLD: 1.27 K/UL (ref 1.1–3.7)
LYMPHOCYTES RELATIVE PERCENT: 37 % (ref 24–43)
MCH RBC QN AUTO: 28.2 PG (ref 25.2–33.5)
MCHC RBC AUTO-ENTMCNC: 33.2 G/DL (ref 28.4–34.8)
MCV RBC AUTO: 85.1 FL (ref 82.6–102.9)
MONOCYTES NFR BLD: 0.4 K/UL (ref 0.1–1.2)
MONOCYTES NFR BLD: 12 % (ref 3–12)
NEUTROPHILS NFR BLD: 48 % (ref 36–65)
NEUTS SEG NFR BLD: 1.65 K/UL (ref 1.5–8.1)
NRBC BLD-RTO: 0 PER 100 WBC
PLATELET # BLD AUTO: ABNORMAL K/UL (ref 138–453)
PLATELET, FLUORESCENCE: 120 K/UL (ref 138–453)
PLATELETS.RETICULATED NFR BLD AUTO: 3.9 % (ref 1.1–10.3)
POTASSIUM SERPL-SCNC: 3.7 MMOL/L (ref 3.7–5.3)
RBC # BLD AUTO: 5.42 M/UL (ref 4.21–5.77)
SODIUM SERPL-SCNC: 140 MMOL/L (ref 136–145)
WBC OTHER # BLD: 3.4 K/UL (ref 3.5–11.3)

## 2024-08-26 PROCEDURE — 99233 SBSQ HOSP IP/OBS HIGH 50: CPT | Performed by: PSYCHIATRY & NEUROLOGY

## 2024-08-26 PROCEDURE — 6370000000 HC RX 637 (ALT 250 FOR IP)

## 2024-08-26 PROCEDURE — 6360000002 HC RX W HCPCS

## 2024-08-26 PROCEDURE — 36415 COLL VENOUS BLD VENIPUNCTURE: CPT

## 2024-08-26 PROCEDURE — B31RYZZ FLUOROSCOPY OF INTRACRANIAL ARTERIES USING OTHER CONTRAST: ICD-10-PCS | Performed by: PSYCHIATRY & NEUROLOGY

## 2024-08-26 PROCEDURE — 36226 PLACE CATH VERTEBRAL ART: CPT

## 2024-08-26 PROCEDURE — C1769 GUIDE WIRE: HCPCS

## 2024-08-26 PROCEDURE — 36224 PLACE CATH CAROTD ART: CPT

## 2024-08-26 PROCEDURE — 2580000003 HC RX 258: Performed by: STUDENT IN AN ORGANIZED HEALTH CARE EDUCATION/TRAINING PROGRAM

## 2024-08-26 PROCEDURE — 97535 SELF CARE MNGMENT TRAINING: CPT

## 2024-08-26 PROCEDURE — 36227 PLACE CATH XTRNL CAROTID: CPT

## 2024-08-26 PROCEDURE — 6360000002 HC RX W HCPCS: Performed by: STUDENT IN AN ORGANIZED HEALTH CARE EDUCATION/TRAINING PROGRAM

## 2024-08-26 PROCEDURE — C1887 CATHETER, GUIDING: HCPCS

## 2024-08-26 PROCEDURE — 99152 MOD SED SAME PHYS/QHP 5/>YRS: CPT

## 2024-08-26 PROCEDURE — 99233 SBSQ HOSP IP/OBS HIGH 50: CPT | Performed by: SURGERY

## 2024-08-26 PROCEDURE — 93306 TTE W/DOPPLER COMPLETE: CPT | Performed by: INTERNAL MEDICINE

## 2024-08-26 PROCEDURE — 82947 ASSAY GLUCOSE BLOOD QUANT: CPT

## 2024-08-26 PROCEDURE — C1894 INTRO/SHEATH, NON-LASER: HCPCS

## 2024-08-26 PROCEDURE — 85025 COMPLETE CBC W/AUTO DIFF WBC: CPT

## 2024-08-26 PROCEDURE — 80048 BASIC METABOLIC PNL TOTAL CA: CPT

## 2024-08-26 PROCEDURE — 2060000000 HC ICU INTERMEDIATE R&B

## 2024-08-26 PROCEDURE — 2580000003 HC RX 258: Performed by: PSYCHIATRY & NEUROLOGY

## 2024-08-26 PROCEDURE — 6370000000 HC RX 637 (ALT 250 FOR IP): Performed by: STUDENT IN AN ORGANIZED HEALTH CARE EDUCATION/TRAINING PROGRAM

## 2024-08-26 PROCEDURE — 93306 TTE W/DOPPLER COMPLETE: CPT

## 2024-08-26 PROCEDURE — 95816 EEG AWAKE AND DROWSY: CPT | Performed by: PSYCHIATRY & NEUROLOGY

## 2024-08-26 PROCEDURE — 99231 SBSQ HOSP IP/OBS SF/LOW 25: CPT | Performed by: NURSE PRACTITIONER

## 2024-08-26 PROCEDURE — 85055 RETICULATED PLATELET ASSAY: CPT

## 2024-08-26 PROCEDURE — 97165 OT EVAL LOW COMPLEX 30 MIN: CPT

## 2024-08-26 PROCEDURE — 99153 MOD SED SAME PHYS/QHP EA: CPT

## 2024-08-26 PROCEDURE — 6360000004 HC RX CONTRAST MEDICATION: Performed by: SURGERY

## 2024-08-26 RX ORDER — ONDANSETRON 4 MG/1
4 TABLET, ORALLY DISINTEGRATING ORAL EVERY 8 HOURS PRN
Status: DISCONTINUED | OUTPATIENT
Start: 2024-08-26 | End: 2024-08-27 | Stop reason: HOSPADM

## 2024-08-26 RX ORDER — SODIUM CHLORIDE 9 MG/ML
INJECTION, SOLUTION INTRAVENOUS PRN
Status: DISCONTINUED | OUTPATIENT
Start: 2024-08-26 | End: 2024-08-27 | Stop reason: HOSPADM

## 2024-08-26 RX ORDER — SODIUM CHLORIDE 0.9 % (FLUSH) 0.9 %
5-40 SYRINGE (ML) INJECTION EVERY 12 HOURS SCHEDULED
Status: DISCONTINUED | OUTPATIENT
Start: 2024-08-26 | End: 2024-08-27

## 2024-08-26 RX ORDER — ATORVASTATIN CALCIUM 20 MG/1
20 TABLET, FILM COATED ORAL DAILY
Status: DISCONTINUED | OUTPATIENT
Start: 2024-08-26 | End: 2024-08-27 | Stop reason: HOSPADM

## 2024-08-26 RX ORDER — ACETAMINOPHEN 325 MG/1
650 TABLET ORAL EVERY 4 HOURS PRN
Status: DISCONTINUED | OUTPATIENT
Start: 2024-08-26 | End: 2024-08-26

## 2024-08-26 RX ORDER — MIDAZOLAM HYDROCHLORIDE 2 MG/2ML
INJECTION, SOLUTION INTRAMUSCULAR; INTRAVENOUS PRN
Status: COMPLETED | OUTPATIENT
Start: 2024-08-26 | End: 2024-08-26

## 2024-08-26 RX ORDER — FENTANYL CITRATE 50 UG/ML
INJECTION, SOLUTION INTRAMUSCULAR; INTRAVENOUS PRN
Status: COMPLETED | OUTPATIENT
Start: 2024-08-26 | End: 2024-08-26

## 2024-08-26 RX ORDER — HEPARIN SODIUM 1000 [USP'U]/ML
INJECTION, SOLUTION INTRAVENOUS; SUBCUTANEOUS PRN
Status: COMPLETED | OUTPATIENT
Start: 2024-08-26 | End: 2024-08-26

## 2024-08-26 RX ORDER — ONDANSETRON 2 MG/ML
4 INJECTION INTRAMUSCULAR; INTRAVENOUS EVERY 6 HOURS PRN
Status: DISCONTINUED | OUTPATIENT
Start: 2024-08-26 | End: 2024-08-27 | Stop reason: HOSPADM

## 2024-08-26 RX ORDER — SODIUM CHLORIDE 0.9 % (FLUSH) 0.9 %
5-40 SYRINGE (ML) INJECTION PRN
Status: DISCONTINUED | OUTPATIENT
Start: 2024-08-26 | End: 2024-08-27 | Stop reason: HOSPADM

## 2024-08-26 RX ORDER — IODIXANOL 270 MG/ML
150 INJECTION, SOLUTION INTRAVASCULAR
Status: COMPLETED | OUTPATIENT
Start: 2024-08-26 | End: 2024-08-26

## 2024-08-26 RX ORDER — VALSARTAN 40 MG/1
20 TABLET ORAL DAILY
Status: DISCONTINUED | OUTPATIENT
Start: 2024-08-26 | End: 2024-08-27 | Stop reason: HOSPADM

## 2024-08-26 RX ORDER — LEVOTHYROXINE SODIUM 75 UG/1
150 TABLET ORAL DAILY
Status: DISCONTINUED | OUTPATIENT
Start: 2024-08-26 | End: 2024-08-27 | Stop reason: HOSPADM

## 2024-08-26 RX ADMIN — SODIUM CHLORIDE: 9 INJECTION, SOLUTION INTRAVENOUS at 10:14

## 2024-08-26 RX ADMIN — HEPARIN SODIUM 2000 UNITS: 1000 INJECTION, SOLUTION INTRAVENOUS; SUBCUTANEOUS at 07:54

## 2024-08-26 RX ADMIN — LEVETIRACETAM 500 MG: 500 TABLET, FILM COATED ORAL at 21:15

## 2024-08-26 RX ADMIN — ACETAMINOPHEN 1000 MG: 500 TABLET ORAL at 15:03

## 2024-08-26 RX ADMIN — HYDRALAZINE HYDROCHLORIDE 10 MG: 20 INJECTION INTRAMUSCULAR; INTRAVENOUS at 03:46

## 2024-08-26 RX ADMIN — ATORVASTATIN CALCIUM 20 MG: 20 TABLET, FILM COATED ORAL at 15:03

## 2024-08-26 RX ADMIN — POLYETHYLENE GLYCOL 3350 17 G: 17 POWDER, FOR SOLUTION ORAL at 12:07

## 2024-08-26 RX ADMIN — CEFAZOLIN SODIUM 2000 MG: 500 INJECTION, POWDER, FOR SOLUTION INTRAMUSCULAR; INTRAVENOUS at 07:45

## 2024-08-26 RX ADMIN — SODIUM CHLORIDE: 9 INJECTION, SOLUTION INTRAVENOUS at 03:41

## 2024-08-26 RX ADMIN — SODIUM CHLORIDE, PRESERVATIVE FREE 10 ML: 5 INJECTION INTRAVENOUS at 12:07

## 2024-08-26 RX ADMIN — VALSARTAN 20 MG: 40 TABLET, FILM COATED ORAL at 15:03

## 2024-08-26 RX ADMIN — LEVOTHYROXINE SODIUM 150 MCG: 75 TABLET ORAL at 12:06

## 2024-08-26 RX ADMIN — SODIUM CHLORIDE, PRESERVATIVE FREE 10 ML: 5 INJECTION INTRAVENOUS at 12:06

## 2024-08-26 RX ADMIN — ACETAMINOPHEN 1000 MG: 500 TABLET ORAL at 21:15

## 2024-08-26 RX ADMIN — LEVETIRACETAM 500 MG: 500 TABLET, FILM COATED ORAL at 12:06

## 2024-08-26 RX ADMIN — IODIXANOL 74 ML: 270 INJECTION, SOLUTION INTRAVASCULAR at 09:16

## 2024-08-26 RX ADMIN — SODIUM CHLORIDE, PRESERVATIVE FREE 10 ML: 5 INJECTION INTRAVENOUS at 21:59

## 2024-08-26 RX ADMIN — SODIUM CHLORIDE, PRESERVATIVE FREE 10 ML: 5 INJECTION INTRAVENOUS at 20:00

## 2024-08-26 RX ADMIN — SENNOSIDES AND DOCUSATE SODIUM 2 TABLET: 50; 8.6 TABLET ORAL at 17:52

## 2024-08-26 RX ADMIN — MIDAZOLAM HYDROCHLORIDE 1 MG: 1 INJECTION, SOLUTION INTRAMUSCULAR; INTRAVENOUS at 07:52

## 2024-08-26 RX ADMIN — FAMOTIDINE 20 MG: 20 TABLET, FILM COATED ORAL at 12:06

## 2024-08-26 RX ADMIN — FENTANYL CITRATE 50 MCG: 50 INJECTION, SOLUTION INTRAMUSCULAR; INTRAVENOUS at 07:53

## 2024-08-26 ASSESSMENT — PAIN DESCRIPTION - LOCATION
LOCATION: ABDOMEN;NECK;RIB CAGE
LOCATION: ABDOMEN;NECK

## 2024-08-26 ASSESSMENT — PAIN SCALES - GENERAL
PAINLEVEL_OUTOF10: 4
PAINLEVEL_OUTOF10: 2
PAINLEVEL_OUTOF10: 4
PAINLEVEL_OUTOF10: 2

## 2024-08-26 ASSESSMENT — PAIN - FUNCTIONAL ASSESSMENT
PAIN_FUNCTIONAL_ASSESSMENT: ACTIVITIES ARE NOT PREVENTED

## 2024-08-26 ASSESSMENT — PAIN DESCRIPTION - ONSET: ONSET: ON-GOING

## 2024-08-26 ASSESSMENT — PAIN DESCRIPTION - DESCRIPTORS
DESCRIPTORS: ACHING

## 2024-08-26 ASSESSMENT — PAIN DESCRIPTION - FREQUENCY
FREQUENCY: INTERMITTENT
FREQUENCY: CONTINUOUS

## 2024-08-26 NOTE — PROGRESS NOTES
Endovascular Neurosurgery progress note    Reason for evaluation: incidentally-discovered large L AVM  Referring Provider: Ana Ceballos MD     SUBJECTIVE:     NAEO from EVN perspective.  Will proceed with diagnostic cerebral angiogram this morning    History of Chief Complaint:    Nataliia Lao is a 84 y.o. male with pmh of ?DM on Metformin, HLD, taking Aspirin by pt's choice. Pt fell at home GLF and was admitted to the trauma team. CT brain incidentally discovered a large L AVM, which was confirmed on the CTA head. EVN consulted for AVM management.    Allergies  has No Known Allergies.  Medications  Prior to Admission medications    Medication Sig Start Date End Date Taking? Authorizing Provider   sennosides-docusate sodium (SENOKOT-S) 8.6-50 MG tablet Take 2 tablets by mouth daily   Yes ProviderStacy MD   levothyroxine (SYNTHROID) 150 MCG tablet Take 1 tablet by mouth Daily   Yes ProviderStacy MD   atorvastatin (LIPITOR) 20 MG tablet Take 1 tablet by mouth daily   Yes ProviderStacy MD   metFORMIN (GLUCOPHAGE-XR) 500 MG extended release tablet Take 1 tablet by mouth daily (with breakfast)   Yes ProviderStacy MD   felodipine (PLENDIL) 10 MG extended release tablet Take 1 tablet by mouth daily   Yes ProviderStacy MD   metoprolol succinate (TOPROL XL) 25 MG extended release tablet Take 1 tablet by mouth daily   Yes ProviderStacy MD   candesartan (ATACAND) 16 MG tablet Take 1 tablet by mouth daily   Yes ProviderStacy MD   aspirin 81 MG EC tablet Take 1 tablet by mouth daily    ProviderStacy MD    Scheduled Meds:   sennosides-docusate sodium  2 tablet Oral Dinner    famotidine  20 mg Oral Daily    insulin lispro  0-16 Units SubCUTAneous 4x Daily AC & HS    labetalol  5 mg IntraVENous Once    sodium chloride flush  5-40 mL IntraVENous 2 times per day    polyethylene glycol  17 g Oral Daily    acetaminophen  1,000 mg Oral 3 times per day

## 2024-08-26 NOTE — PROGRESS NOTES
POST PROCEDURE NOTE    SUBJECTIVE  Pt s/p DSA.  Seen and examined at the bedside.  Patient enjoying lunch currently.  He has no acute complaints.    OBJECTIVE  VITALS:  BP (!) 147/52   Pulse 55   Temp 98.6 °F (37 °C) (Oral)   Resp 17   Ht 1.702 m (5' 7\")   Wt 68 kg (149 lb 14.6 oz)   SpO2 95%   BMI 23.48 kg/m²         GENERAL:  awake and alert.  No acute distress  CARDIOVASCULAR:  regular rate and rhythm   LUNGS: No respiratory distress, no accessory muscle use  ABDOMEN:   Soft, nondistended  INCISION: Access site with dressing intact, no strikethrough, no sign for expanding hematoma or pseudoaneurysm    ASSESSMENT  1. POD# 0 s/p DSA    PLAN  1. Pain management- MMPT  2.  Access site without hematoma, pseudoaneurysm, active bleeding    Francisca Zaragoza DO  Trauma/Surgery Service  8/26/2024 at 3:12 PM

## 2024-08-26 NOTE — PROGRESS NOTES
PROGRESS NOTE          PATIENT NAME: Nataliia Lao  MEDICAL RECORD NO. [id number]  DATE: 2024  SURGEON: Ana Ceballos MD  PRIMARY CARE PHYSICIAN: No primary care provider on file.    HD: # 3    ASSESSMENT    Patient Active Problem List   Diagnosis    SAH (subarachnoid hemorrhage) (HCC)    Intracranial hemorrhage (HCC)    AVM (arteriovenous malformation) brain    Syncope       MEDICAL DECISION MAKING AND PLAN    SDH/SAH BIG3, L occipital AVM   -nsgy and neuro endovascular following    -rCTH stable   -DSA today- SBP goal 100-140   -f/u Dr. Brown in 4-6 wks and Dr. Porras in 3-4 months    -Hold ASA unless medical/cardiac indications per neuroendovascular   -cards on for syncopal work up    -echo: EF 65%, normal wall motion, mild stenosis of aortic valve   -PT/OT    DVT ppx: on hold per nsyg    Dispo: plan to return home with son       SUBJECTIVE    Patient seen at the bedside. Family is present in the room. No acute overnight events or concerns per nursing. Patient had a cerebral angiogram today. VSS, labs stable.     OBJECTIVE  VITALS: Temp: Temp: 98.6 °F (37 °C)Temp  Av.7 °F (37.1 °C)  Min: 98.5 °F (36.9 °C)  Max: 98.9 °F (37.2 °C) BP Systolic (24hrs), Av , Min:115 , Max:176   Diastolic (24hrs), Av, Min:50, Max:83   Pulse Pulse  Av  Min: 56  Max: 93 Resp Resp  Av.3  Min: 13  Max: 27 Pulse ox SpO2  Av.1 %  Min: 94 %  Max: 96 %  Physical Exam  Constitutional:       General: He is not in acute distress.     Appearance: He is not toxic-appearing.   HENT:      Head: Normocephalic and atraumatic.   Cardiovascular:      Rate and Rhythm: Normal rate and regular rhythm.   Pulmonary:      Effort: Pulmonary effort is normal. No respiratory distress.   Abdominal:      General: Abdomen is flat. There is no distension.   Skin:     General: Skin is warm and dry.   Neurological:      General: No focal deficit present.      Mental Status: He is alert.         LAB:  CBC:   Recent Labs  osseous destructive lesion.      CTA HEAD:    ANTERIOR CIRCULATION: Fibrocalcific plaque is noted in the carotid siphons  without evidence of a flow-limiting stenosis.  The anterior cerebral arteries  are patent without evidence of a flow-limiting stenosis.    The middle cerebral arteries are unremarkable.    POSTERIOR CIRCULATION: The basilar artery is patent.  The right posterior  cerebral artery is unremarkable.  There is hypertrophy of the left posterior  cerebral artery which is the vascular supply to the AVM primarily in the left  parietooccipital region.  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  There are numerous draining veins primarily extending to the superior  sagittal sinus and left transverse sinus.    OTHER: No dural venous sinus thrombosis on this non-dedicated study.    Impression  1. AVM along the left parietooccipital region with hypertrophy of the left  posterior cerebral artery which is the main arterial vascular supply. 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  2. There is still a subdural hemorrhage along the right tentorium measuring 3  mm in thickness. Previous areas of subarachnoid hemorrhage along the right  cerebral convexity have substantially improved.  3. 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  4. There are suspected stones in the submandibular glands without adjacent  inflammatory changes. There may be small stones in the right submandibular  duct.  5. Dental caries with periodontal disease.      Electronically signed by KATHERYN Llanes CNP on 8/26/24 at 2:59 PM EDT

## 2024-08-26 NOTE — PROGRESS NOTES
[facility] Inpatient Progress Note  JENNYFER KEEN   8/26/2024    Nataliia Lao  1940  [id number]      Time spent with Patient: 0 minutes  Time spent with Family:     This writer was unable to complete screen or therapy services with patient at bedside at this time due to the following:  Out of room for medical procedure: interventional radiology

## 2024-08-26 NOTE — CARE COORDINATION
Transitional Planning  Spoke with son at bedside, plan on returning home with son, no further needs, has transportation.

## 2024-08-26 NOTE — PROGRESS NOTES
[facility]  Progress Note                   Date:   8/25/2024  Patient name: Nataliia Lao  Date of admission:  8/23/2024  9:42 AM  MRN:   [id number]  YOB: 1940  PCP: No primary care provider on file.    Reason for Admission: SAH (subarachnoid hemorrhage) ([facility]) [I60.9]    Subjective:       Clinical Changes /Abnormalities:Patient seen and examined after [facility]. 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 Tele/vitals/labs reviewed .       Review of Systems    Medications:   Scheduled Meds:   sennosides-docusate sodium  2 tablet Oral Dinner    famotidine  20 mg Oral Daily    insulin lispro  0-16 Units SubCUTAneous 4x Daily AC & HS    labetalol  5 mg IntraVENous Once    sodium chloride flush  5-40 mL IntraVENous 2 times per day    polyethylene glycol  17 g Oral Daily    acetaminophen  1,000 mg Oral 3 times per day    levETIRAcetam  500 mg Oral BID    atorvastatin  20 mg Oral Nightly    levothyroxine  150 mcg Oral Daily     Continuous Infusions:   dextrose      sodium chloride 75 mL/hr at 08/25/24 0202    sodium chloride       CBC:   Recent Labs     08/23/24  0955 08/24/24  0354 08/25/24  0453   WBC 6.5 3.4* 3.7   HGB 16.7 15.5 14.8    118* 116*     BMP:    Recent Labs     08/23/24  0955 08/24/24  0354 08/25/24  0453    138 139   K 4.5 3.9 3.8    105 104   CO2 23 21 25   BUN 20 16 21   CREATININE 1.3* 1.2 1.2   GLUCOSE 116* 91 78     Hepatic:  Recent Labs     08/23/24  0645   AST 20   ALT 22   BILITOT 0.6   ALKPHOS 53     Troponin:   Recent Labs     08/23/24  0645 08/23/24  0843 08/24/24  0354   TROPHS 25* 35* 29*     BNP: No results for input(s): \"BNP\" in the last 72 hours.  Lipids: No results for input(s): \"CHOL\", \"HDL\" in the last 72 hours.    Invalid input(s): \"LDLCALCU\"  INR:   Recent Labs     08/23/24  0645 08/23/24  0955   INR 1.0 1.1     Consult Orders   Inpatient consult to Cardiology [[id number]] ordered by Richi  Alex Nash MD at 08/24/24 1605          Expand All Collapse All       [facility]    Consult / H&P                 Today's Date:     8/25/2024  Patient Name:    Nataliia Lao  Date of admission:          8/23/2024  9:42 AM  Patient's age:      84 y.o., 1940  Admission Dx:  SAH (subarachnoid hemorrhage) (HCC) [I60.9]     Requesting Physician: Ana Ceballos MD     Cardiac Evaluation Reason: Syncope     History Obtained From: patient/chart review      History of Present Illness:    This patient 84 y.o. years old male with past medical history as below.      Patient with PMH of HTN, HLD, hypothyroidism and AVMs presented to ER after syncopal episode at home.  Reportedly patient had loss of consciousness with confusion after the fall.  Patient's grandson present at bedside denies any previous history of falls or syncope.  He denies patient mentioning any symptoms of chest pain, shortness of breath, dizziness or lightheadedness.  Patient takes aspirin at home.     On initial evaluation patient was hypertensive with systolic blood pressure in 160s, ecchymosis around the left eye.  Workup showed subdural and subarachnoid hemorrhage and left occipital AVM.  MRI showed edema adjacent to AVM.  Patient is thought to have a possible seizure secondary to AVM.  He was evaluated by NSG and neuroendovascular.  Plan is for potential DSA with intervention.     Cardiology is consulted for evaluation of syncope.  Per chart review and collateral history patient has no previous history of syncopal episodes.  Family reports him being started on metoprolol recently and are concerned about drop in blood pressure.  He was taking Toprol-XL 25, candesartan 16, aspirin.  High-sensitivity troponin mildly elevated 25  > 35  > 29.  No previous cardiac workup available and an echocardiogram has been ordered.     Past Medica T2DM l History:   has a past medical history of Hyperlipidemia, Hypertension, and Thyroid  exam  HEENT: Head: Normocephalic, no lesions, without obvious abnormality.  Neck:no JVD, trachea midline, no adenopathy  Lungs: Clear to auscultation  Heart: Regular rate and rhythm, s1/s2 auscultated, no murmurs  Abdomen: soft, non-tender, bowel sounds active  Extremities: no edema  Neurologic: not done        Assessment / Acute Cardiac Problems:     Syncopal episode most likely secondary to seizure.  Traumatic subdural and subarachnoid hemorrhage.  Incidental large left parieto-occipital AVM plan for DSA  Concern for seizure disorder secondary to AVM, ongoing workup with EEG per neurology  Elevated troponin  Sinus bradycardia  Hypertension  Hyperlipidemia  T2DM  Hypothyroidism  Patient Active Problem List:     SAH (subarachnoid hemorrhage) (HCC)     Intracranial hemorrhage (HCC)     AVM (arteriovenous malformation) brain     Syncope      Plan of Treatment:      Continue to hold BB   SBP goal 100-140 strict per neuro  Neuro surg following  AVM   Seizure workup per neuro   Awaiting echo     Electronically signed by KATHERYN Navarro NP on 8/25/2024 at 9:03 PM  [facility]  [phone]

## 2024-08-26 NOTE — PROGRESS NOTES
Spoke with son at bedside, son declined use of  services and wants his son or him to interpret for his father when needed.

## 2024-08-26 NOTE — PROGRESS NOTES
Occupational Therapy  Facility/Department: [address] NEURO   Occupational Therapy Initial Evaluation    Patient Name: Nataliia Lao        MRN: [id number]    : 1940    Date of Service: 2024    Chief Complaint   Patient presents with    Fall     Discharge Recommendations No therapy recommended at discharge.       OT Equipment Recommendations  Equipment Needed: Yes  Mobility Devices: ADL Assistive Devices  ADL Assistive Devices: Transfer Tub Bench    Assessment  Performance deficits / Impairments: Decreased balance;Decreased endurance;Decreased functional mobility ;Decreased ADL status;Decreased high-level IADLs  Prognosis: Good  Decision Making: Medium Complexity  REQUIRES OT FOLLOW-UP: Yes  Activity Tolerance  Activity Tolerance: Patient Tolerated treatment well  Safety Devices  Type of Devices: Patient at risk for falls;Call light within reach;Gait belt;Nurse notified;Left in bed  Restraints  Restraints Initially in Place: No    Restrictions/Precautions  Restrictions/Precautions  Restrictions/Precautions: General Precautions  Required Braces or Orthoses?: No  Position Activity Restriction  Other position/activity restrictions: SBP goal 100-140    Subjective  General  Patient assessed for rehabilitation services?: Yes  Family / Caregiver Present: No  Diagnosis: SDH/SAH, L AVM, Found down  Subjective  Subjective: RN approved therapy session, pt states having no significant pain at this time, pt not fully understanding reason for therapy-grandson translating at times (family's request to not use )       Home Setup/Prior Level of Function  Social/Functional History  Lives With: Son;Family;Spouse (WIfe, son, DIL, 3 adult grandchildren)  Type of Home: Apartment  Home Layout: One level (2nd floor)  Home Access: Stairs to enter with rails  Bathroom Shower/Tub: Tub/Shower unit  Bathroom Toilet: Handicap height  Bathroom Equipment: Grab bars in shower  Home Equipment:  (none)  ADL Assistance:  Activity Raw Score: 21  AM-PAC Inpatient ADL T-Scale Score : 44.27  ADL Inpatient CMS 0-100% Score: 32.79  ADL Inpatient CMS G-Code Modifier : CJ    Minutes  OT Individual Minutes  Time In: 1429  Time Out: 1448  Minutes: 19  Time Code Minutes   Timed Code Treatment Minutes: 15 Minutes      Electronically signed by IDALIA Martinez on 8/26/24 at 3:50 PM EDT

## 2024-08-26 NOTE — PROGRESS NOTES
Physical Therapy        Physical Therapy Cancel Note      DATE: 2024    NAME: Naatliia Lao  MRN: [id number]   : 1940      Patient not seen this date for Physical Therapy due to:    Surgery/Procedure: diagnostic angio . PT will check back as time allows.      Electronically signed by Tiana Christensen PT on 2024 at 8:05 AM

## 2024-08-26 NOTE — CARE COORDINATION
Met with pt to complete an SBIRT.  Pt had a fall at home.  He states that he does not drink or use drugs.  Pt denied depression.            Alcohol Screening and Brief Intervention        No results for input(s): \"ALC\" in the last 72 hours.    Alcohol Pre-screening  (MEN ONLY) How many times in the past year have you had 5 or more drinks in a day?: None          Drug Pre-Screening        Drug Screening DAST       Mood Pre-Screening (PHQ-2)  During the past 2 weeks, have you been bothered by, feeling down, depressed or hopeless?  No        I have interviewed Nataliia Lao, [id number] regarding  His alcohol consumption/drug use and risk for excessive use. Screenings were negative.  Patient  N/A intervention at this time.   Deferred []    Completed on: 8/26/2024   CELSO BUTTERFIELD

## 2024-08-26 NOTE — BRIEF OP NOTE
[facility]    NEUROENDOVASCULAR SERVICE: POST-OP NOTE: 8/26/2024    Pt Name: Nataliia Lao  MRN: [id number]  YOB: 1940  Date of Procedure: 8/26/2024  Primary Care Physician: No primary care provider on file.        Pre-Procedural Diagnosis: Left parietal occipital AVM  Post-Procedural Diagnosis: Same      Procedure Performed:Diagnostic Cerebral Angiogram    Surgeon:   Gio Porras MD    Fellow:  Frederic Souza MD and Cyrus Rapp MD PhD     Assisting Tech:  Rissa Clark    PRE-PROCEDURAL EXAM:  Prestroke baseline mRS MODIFIED MARCO A SCORE: 0 - No symptoms at all.  Neurological exam performed and unchanged from initial H&P or consult      Anesthesia: IV Moderate Sedation  An Immediate re-assessment was completed prior to sedation, and it is determined to be safe to proceed.  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                   POST-PROCEDURAL EXAM :   Stable neurological Exam  Neurological exam performed and unchanged from initial H&P or consult    Closure:  right Vascade 5   F        POST-PROCEDURAL MONITORING : see orders  Disposition: Neuro Stepdown      Recommendations:  Back to Neuro Stepdown  Do not bend right leg for 3 hours.  Groin checks per protocol.  Peripheral pulse checks per protocol.  SBP goal 100-140  Follow up with Frederic Souza MD  4-6 weeks after discharge and Dr. Porras 3-4 months after discharge.        MD Gio Mcdermott MD   Pager [phone]  Stroke,  Neurocritical Care & Neurointervention  [facility]  [facility] Stroke [facility] [place]  Electronically signed 8/26/2024 at 9:50 AM

## 2024-08-26 NOTE — PLAN OF CARE
Problem: Neurosensory - Adult  Goal: Achieves stable or improved neurological status  8/26/2024 1749 by Sejal Shoemaker RN  Outcome: Progressing  Flowsheets (Taken 8/26/2024 0930)  Achieves stable or improved neurological status:   Assess for and report changes in neurological status   Initiate measures to prevent increased intracranial pressure   Maintain blood pressure and fluid volume within ordered parameters to optimize cerebral perfusion and minimize risk of hemorrhage   Monitor temperature, glucose, and sodium. Initiate appropriate interventions as ordered  8/26/2024 0543 by Elisha Pinedo RN  Outcome: Progressing  Goal: Absence of seizures  8/26/2024 1749 by Sejal Shoemaker RN  Outcome: Progressing  Flowsheets (Taken 8/26/2024 0930)  Absence of seizures:   Monitor for seizure activity.  If seizure occurs, document type and location of movements and any associated apnea   Administer anticonvulsants as ordered   If seizure occurs, turn head to side and suction secretions as needed   Support airway/breathing, administer oxygen as needed   Diagnostic studies as ordered  8/26/2024 0543 by Elisha Pinedo RN  Outcome: Progressing  Goal: Remains free of injury related to seizures activity  8/26/2024 1749 by Sejal Shoemaker RN  Outcome: Progressing  Flowsheets (Taken 8/26/2024 0930)  Remains free of injury related to seizure activity:   Maintain airway, patient safety  and administer oxygen as ordered   Monitor patient for seizure activity, document and report duration and description of seizure to Licensed Independent Practitioner   Reorient patient post seizure   Seizure pads on all 4 side rails   Instruct patient/family to notify RN of any seizure activity   Instruct patient/family to call for assistance with activity based on assessment   If seizure occurs, turn patient to side and suction secretions as needed  8/26/2024 0543 by Elisha Pinedo RN  Outcome: Progressing  Goal: Achieves  wounds/incisions during mobilization   Assist with transfers and ambulation using safe patient handling equipment as needed  8/26/2024 0543 by Elisha Pinedo RN  Outcome: Progressing  Goal: Maintain proper alignment of affected body part  Outcome: Progressing  Flowsheets (Taken 8/26/2024 0930)  Maintain proper alignment of affected body part:   Support and protect limb and body alignment per provider's orders   Instruct and reinforce with patient and family use of appropriate assistive device and precautions (e.g. spinal or hip dislocation precautions)  Goal: Return ADL status to a safe level of function  Outcome: Progressing  Flowsheets (Taken 8/26/2024 0930)  Return ADL Status to a Safe Level of Function:   Administer medication as ordered   Assess activities of daily living deficits and provide assistive devices as needed   Assist and instruct patient to increase activity and self care as tolerated   Obtain physical therapy/occupational therapy consults as needed

## 2024-08-26 NOTE — PLAN OF CARE
Problem: Neurosensory - Adult  Goal: Achieves stable or improved neurological status  Outcome: Progressing  Goal: Absence of seizures  Outcome: Progressing  Goal: Remains free of injury related to seizures activity  Outcome: Progressing  Goal: Achieves maximal functionality and self care  Outcome: Progressing     Problem: Cardiovascular - Adult  Goal: Maintains optimal cardiac output and hemodynamic stability  Outcome: Progressing     Problem: Skin/Tissue Integrity - Adult  Goal: Skin integrity remains intact  Outcome: Progressing     Problem: Musculoskeletal - Adult  Goal: Return mobility to safest level of function  Outcome: Progressing

## 2024-08-27 ENCOUNTER — APPOINTMENT (OUTPATIENT)
Age: 84
DRG: 055 | End: 2024-08-27
Payer: MEDICAID

## 2024-08-27 VITALS
WEIGHT: 156.53 LBS | RESPIRATION RATE: 18 BRPM | BODY MASS INDEX: 24.57 KG/M2 | SYSTOLIC BLOOD PRESSURE: 142 MMHG | OXYGEN SATURATION: 95 % | DIASTOLIC BLOOD PRESSURE: 55 MMHG | HEART RATE: 62 BPM | TEMPERATURE: 98.5 F | HEIGHT: 67 IN

## 2024-08-27 LAB
ANION GAP SERPL CALCULATED.3IONS-SCNC: 10 MMOL/L (ref 9–16)
BASOPHILS # BLD: <0.03 K/UL (ref 0–0.2)
BASOPHILS NFR BLD: 0 % (ref 0–2)
BUN SERPL-MCNC: 12 MG/DL (ref 8–23)
CALCIUM SERPL-MCNC: 8.8 MG/DL (ref 8.6–10.4)
CHLORIDE SERPL-SCNC: 109 MMOL/L (ref 98–107)
CO2 SERPL-SCNC: 22 MMOL/L (ref 20–31)
CREAT SERPL-MCNC: 1 MG/DL (ref 0.7–1.2)
ECHO BSA: 1.79 M2
EOSINOPHIL # BLD: 0.18 K/UL (ref 0–0.44)
EOSINOPHILS RELATIVE PERCENT: 5 % (ref 1–4)
ERYTHROCYTE [DISTWIDTH] IN BLOOD BY AUTOMATED COUNT: 13 % (ref 11.8–14.4)
GFR, ESTIMATED: 78 ML/MIN/1.73M2
GLUCOSE SERPL-MCNC: 99 MG/DL (ref 74–99)
HCT VFR BLD AUTO: 46.1 % (ref 40.7–50.3)
HGB BLD-MCNC: 14.9 G/DL (ref 13–17)
IMM GRANULOCYTES # BLD AUTO: <0.03 K/UL (ref 0–0.3)
IMM GRANULOCYTES NFR BLD: 0 %
LYMPHOCYTES NFR BLD: 1.23 K/UL (ref 1.1–3.7)
LYMPHOCYTES RELATIVE PERCENT: 37 % (ref 24–43)
MCH RBC QN AUTO: 28.3 PG (ref 25.2–33.5)
MCHC RBC AUTO-ENTMCNC: 32.3 G/DL (ref 28.4–34.8)
MCV RBC AUTO: 87.6 FL (ref 82.6–102.9)
MONOCYTES NFR BLD: 0.42 K/UL (ref 0.1–1.2)
MONOCYTES NFR BLD: 13 % (ref 3–12)
NEUTROPHILS NFR BLD: 45 % (ref 36–65)
NEUTS SEG NFR BLD: 1.49 K/UL (ref 1.5–8.1)
NRBC BLD-RTO: 0 PER 100 WBC
PLATELET # BLD AUTO: 116 K/UL (ref 138–453)
PMV BLD AUTO: 10.8 FL (ref 8.1–13.5)
POTASSIUM SERPL-SCNC: 3.8 MMOL/L (ref 3.7–5.3)
RBC # BLD AUTO: 5.26 M/UL (ref 4.21–5.77)
SODIUM SERPL-SCNC: 141 MMOL/L (ref 136–145)
WBC OTHER # BLD: 3.3 K/UL (ref 3.5–11.3)

## 2024-08-27 PROCEDURE — 93225 XTRNL ECG REC<48 HRS REC: CPT

## 2024-08-27 PROCEDURE — 85025 COMPLETE CBC W/AUTO DIFF WBC: CPT

## 2024-08-27 PROCEDURE — 6360000002 HC RX W HCPCS

## 2024-08-27 PROCEDURE — 97530 THERAPEUTIC ACTIVITIES: CPT

## 2024-08-27 PROCEDURE — 2580000003 HC RX 258: Performed by: STUDENT IN AN ORGANIZED HEALTH CARE EDUCATION/TRAINING PROGRAM

## 2024-08-27 PROCEDURE — 6370000000 HC RX 637 (ALT 250 FOR IP): Performed by: STUDENT IN AN ORGANIZED HEALTH CARE EDUCATION/TRAINING PROGRAM

## 2024-08-27 PROCEDURE — 96127 BRIEF EMOTIONAL/BEHAV ASSMT: CPT | Performed by: SOCIAL WORKER

## 2024-08-27 PROCEDURE — 36415 COLL VENOUS BLD VENIPUNCTURE: CPT

## 2024-08-27 PROCEDURE — 99233 SBSQ HOSP IP/OBS HIGH 50: CPT | Performed by: PSYCHIATRY & NEUROLOGY

## 2024-08-27 PROCEDURE — 99231 SBSQ HOSP IP/OBS SF/LOW 25: CPT | Performed by: NURSE PRACTITIONER

## 2024-08-27 PROCEDURE — 99233 SBSQ HOSP IP/OBS HIGH 50: CPT | Performed by: NURSE PRACTITIONER

## 2024-08-27 PROCEDURE — 80048 BASIC METABOLIC PNL TOTAL CA: CPT

## 2024-08-27 PROCEDURE — 6370000000 HC RX 637 (ALT 250 FOR IP)

## 2024-08-27 PROCEDURE — 97162 PT EVAL MOD COMPLEX 30 MIN: CPT

## 2024-08-27 RX ORDER — ACETAMINOPHEN 500 MG
500 TABLET ORAL EVERY 8 HOURS PRN
Qty: 120 TABLET | Refills: 3 | Status: SHIPPED | OUTPATIENT
Start: 2024-08-27

## 2024-08-27 RX ORDER — LEVETIRACETAM 500 MG/1
500 TABLET ORAL 2 TIMES DAILY
Qty: 6 TABLET | Refills: 0 | Status: SHIPPED | OUTPATIENT
Start: 2024-08-27 | End: 2024-08-30

## 2024-08-27 RX ADMIN — FAMOTIDINE 20 MG: 20 TABLET, FILM COATED ORAL at 08:46

## 2024-08-27 RX ADMIN — VALSARTAN 20 MG: 40 TABLET, FILM COATED ORAL at 08:47

## 2024-08-27 RX ADMIN — ATORVASTATIN CALCIUM 20 MG: 20 TABLET, FILM COATED ORAL at 08:46

## 2024-08-27 RX ADMIN — SODIUM CHLORIDE, PRESERVATIVE FREE 10 ML: 5 INJECTION INTRAVENOUS at 08:47

## 2024-08-27 RX ADMIN — HYDRALAZINE HYDROCHLORIDE 10 MG: 20 INJECTION INTRAMUSCULAR; INTRAVENOUS at 05:59

## 2024-08-27 RX ADMIN — ACETAMINOPHEN 1000 MG: 500 TABLET ORAL at 05:59

## 2024-08-27 RX ADMIN — LEVOTHYROXINE SODIUM 150 MCG: 75 TABLET ORAL at 08:46

## 2024-08-27 RX ADMIN — LEVETIRACETAM 500 MG: 500 TABLET, FILM COATED ORAL at 08:46

## 2024-08-27 ASSESSMENT — PAIN DESCRIPTION - DESCRIPTORS: DESCRIPTORS: ACHING

## 2024-08-27 ASSESSMENT — PAIN SCALES - GENERAL
PAINLEVEL_OUTOF10: 2
PAINLEVEL_OUTOF10: 4

## 2024-08-27 ASSESSMENT — PAIN DESCRIPTION - LOCATION
LOCATION: NECK;RIB CAGE
LOCATION: NECK;RIB CAGE

## 2024-08-27 NOTE — PROGRESS NOTES
[facility] Progress [facility]  JENNYFER KEEN   8/27/2024    Nataliia Lao  1940  [id number]      Time spent with Patient: 0 minutes  Time spent with Family:     This writer was unable to complete screen or therapy services with patient at bedside at this time due to the following:  Other service provider in room / procedure being performed: PT

## 2024-08-27 NOTE — PLAN OF CARE
--  NO ACUTE NEUROSURGICAL INTERVENTION AT THIS TIME    Plan follow up with Dr Harris in 1-2 months     NEUROSURGERY TO SIGN OFF     Please contact Neurosurgery with any questions.    PATIENT TO FOLLOW UP IN CLINIC:  ---  Follow-up with Neurosurgery  [facility]  [facility] (Medical Office Building 2)  [address]  Call [phone] for an appointment.  --

## 2024-08-27 NOTE — PROGRESS NOTES
PROGRESS NOTE          PATIENT NAME: Nataliia Lao  MEDICAL RECORD NO. [id number]  DATE: 2024  SURGEON: Ana Ceballos MD  PRIMARY CARE PHYSICIAN: No primary care provider on file.    HD: # 4    ASSESSMENT    Patient Active Problem List   Diagnosis    SAH (subarachnoid hemorrhage) (HCC)    Intracranial hemorrhage (HCC)    AVM (arteriovenous malformation) brain    Syncope       MEDICAL DECISION MAKING AND PLAN    SDH/SAH BIG3, L occipital AVM   -NS and EVNS consulted   -DSA , no neuroendovascular intervention required    -f/u Dr. Brown in 4-6 wks and Dr. Porras in 3-4 months    -Hold ASA unless medical/cardiac indications per neuroendovascular   Continue keppra  Syncope  Hypertension   -Cardiology following   -Echo: EF 65%, normal wall motion, mild stenosis of aortic valve, awaiting final recommendations   Needs holter prior to dc   Continue diovan    Pain management   Continue tylenol    GI   Continue pepcid, glycolax, senokot   Continue general diet    DVT ppx: Will start if patient does not discharge today    Dispo: Medically stable for discharge to home.      SUBJECTIVE    Patient seen at the bedside. Family is present in the room. No acute overnight events or concerns per nursing. Patient resting comfortably.    OBJECTIVE  VITALS: Temp: Temp: 98.7 °F (37.1 °C)Temp  Av.4 °F (36.9 °C)  Min: 98 °F (36.7 °C)  Max: 98.7 °F (37.1 °C) BP Systolic (24hrs), Av , Min:104 , Max:160   Diastolic (24hrs), Av, Min:40, Max:92   Pulse Pulse  Av.5  Min: 45  Max: 102 Resp Resp  Av.2  Min: 0  Max: 32 Pulse ox SpO2  Av.6 %  Min: 95 %  Max: 100 %  Physical Exam  Constitutional:       General: He is not in acute distress.     Appearance: He is not toxic-appearing.   HENT:      Head: Normocephalic and atraumatic.      Right Ear: External ear normal.      Left Ear: External ear normal.      Nose: Nose normal.      Mouth/Throat:      Mouth: Mucous membranes are moist.   Eyes:

## 2024-08-27 NOTE — PROGRESS NOTES
Endovascular Neurosurgery progress note    Reason for evaluation: incidentally-discovered large L AVM  Referring Provider: Ana Ceballos MD     SUBJECTIVE:     NAEO from EVN perspective.  Status post DSA.  Doing well.  Okay to be discharged from our end      History of Chief Complaint:    Nataliia Lao is a 84 y.o. male with pmh of ?DM on Metformin, HLD, taking Aspirin by pt's choice. Pt fell at home GLF and was admitted to the trauma team. CT brain incidentally discovered a large L AVM, which was confirmed on the CTA head. EVN consulted for AVM management.    Allergies  has No Known Allergies.  Medications  Prior to Admission medications    Medication Sig Start Date End Date Taking? Authorizing Provider   sennosides-docusate sodium (SENOKOT-S) 8.6-50 MG tablet Take 2 tablets by mouth daily   Yes Stacy Pathak MD   levothyroxine (SYNTHROID) 150 MCG tablet Take 1 tablet by mouth Daily   Yes Stacy Pathak MD   atorvastatin (LIPITOR) 20 MG tablet Take 1 tablet by mouth daily   Yes Stacy Pathak MD   metFORMIN (GLUCOPHAGE-XR) 500 MG extended release tablet Take 1 tablet by mouth daily (with breakfast)   Yes Stacy Pathak MD   felodipine (PLENDIL) 10 MG extended release tablet Take 1 tablet by mouth daily   Yes Stacy Pathak MD   metoprolol succinate (TOPROL XL) 25 MG extended release tablet Take 1 tablet by mouth daily   Yes Stacy Pathak MD   candesartan (ATACAND) 16 MG tablet Take 1 tablet by mouth daily   Yes Stacy Pathak MD   aspirin 81 MG EC tablet Take 1 tablet by mouth daily    Stacy Pathak MD    Scheduled Meds:   sodium chloride flush  5-40 mL IntraVENous 2 times per day    valsartan  20 mg Oral Daily    atorvastatin  20 mg Oral Daily    levothyroxine  150 mcg Oral Daily    sennosides-docusate sodium  2 tablet Oral Dinner    famotidine  20 mg Oral Daily    insulin lispro  0-16 Units SubCUTAneous 4x Daily AC & HS    labetalol  5 mg  IntraVENous Once    sodium chloride flush  5-40 mL IntraVENous 2 times per day    polyethylene glycol  17 g Oral Daily    acetaminophen  1,000 mg Oral 3 times per day    levETIRAcetam  500 mg Oral BID     Continuous Infusions:   sodium chloride      dextrose      sodium chloride       PRN Meds:.sodium chloride flush, sodium chloride, ondansetron **OR** ondansetron, hydrALAZINE, glucose, dextrose bolus **OR** dextrose bolus, glucagon (rDNA), dextrose, sodium chloride flush, sodium chloride, potassium chloride **OR** potassium chloride, magnesium sulfate  Past Medical History   has a past medical history of Hyperlipidemia, Hypertension, and Thyroid disease.  Past Surgical History   has no past surgical history on file.  Social History   reports that he has quit smoking. His smoking use included cigarettes. He does not have any smokeless tobacco history on file.   has no history on file for alcohol use.   has no history on file for drug use.  Family History  family history is not on file.    Review of Systems:  CONSTITUTIONAL:  negative for fevers, chills, fatigue and malaise    EYES:  negative for double vision, blurred vision and photophobia     HEENT:  negative for tinnitus, epistaxis and sore throat    RESPIRATORY:  negative for cough, shortness of breath, wheezing    CARDIOVASCULAR:  negative for chest pain, palpitations, syncope, edema    GASTROINTESTINAL:  negative for nausea, vomiting    GENITOURINARY:  negative for incontinence    MUSCULOSKELETAL:  negative for neck or back pain    NEUROLOGICAL:  Negative for weakness and tingling  negative for headaches and dizziness    PSYCHIATRIC:  negative for anxiety      Review of systems otherwise negative.      OBJECTIVE:     Vitals:    08/27/24 0400   BP: (!) 160/57   Pulse: (!) 45   Resp: 16   Temp:    SpO2: 96%        Gen: No acute distress  MS: Awake, Oriented x3, No obvious aphasia  CN: Pupils reactive, no gaze deviation, no gross facial droop, tongue

## 2024-08-27 NOTE — PROGRESS NOTES
[facility]   Progress Note                   Date:   8/27/2024  Patient name: Nataliia Lao  Date of admission:  8/23/2024  9:42 AM  MRN:   [id number]  YOB: 1940  PCP: No primary care provider on file.    Reason for Admission: SAH (subarachnoid hemorrhage) ([facility]) [I60.9]    Subjective:       Clinical Changes / Abnormalities: Pt seen and examined in the room.  Patient resting in bed with family at bedside. Pt denies any CP or sob.  Labs, vitals and tele reviewed- SR with PVCs and APBs.    Medications:   Scheduled Meds:   sodium chloride flush  5-40 mL IntraVENous 2 times per day    valsartan  20 mg Oral Daily    atorvastatin  20 mg Oral Daily    levothyroxine  150 mcg Oral Daily    sennosides-docusate sodium  2 tablet Oral Dinner    famotidine  20 mg Oral Daily    labetalol  5 mg IntraVENous Once    sodium chloride flush  5-40 mL IntraVENous 2 times per day    polyethylene glycol  17 g Oral Daily    acetaminophen  1,000 mg Oral 3 times per day    levETIRAcetam  500 mg Oral BID     Continuous Infusions:   sodium chloride      dextrose      sodium chloride       CBC:   Recent Labs     08/25/24  0453 08/26/24  0412 08/27/24  0511   WBC 3.7 3.4* 3.3*   HGB 14.8 15.3 14.9   * See Reflexed IPF Result 116*     BMP:    Recent Labs     08/25/24  0453 08/26/24  0412 08/27/24  0511    140 141   K 3.8 3.7 3.8    107 109*   CO2 25 23 22   BUN 21 14 12   CREATININE 1.2 1.0 1.0   GLUCOSE 78 110* 99     Hepatic: No results for input(s): \"AST\", \"ALT\", \"BILITOT\", \"ALKPHOS\" in the last 72 hours.    Invalid input(s): \"ALB\"  Troponin: No results for input(s): \"TROPHS\" in the last 72 hours.  BNP: No results for input(s): \"BNP\" in the last 72 hours.  Lipids: No results for input(s): \"CHOL\", \"HDL\" in the last 72 hours.    Invalid input(s): \"LDLCALCU\"  INR: No results for input(s): \"INR\" in the last 72 hours.    Objective:   Vitals: BP (!) 143/58   Pulse 60   Temp 98.8 °F (37.1 °C)

## 2024-08-27 NOTE — PROGRESS NOTES
Physical Therapy  Facility/Department: [address] NEURO  Physical Therapy Initial Assessment    Name: Nataliia Lao  : 1940  MRN: [id number]  Date of Service: 2024  Chief Complaint   Patient presents with    Fall        Discharge Recommendations: Further therapy recommended at discharge.              Patient Diagnosis(es): The primary encounter diagnosis was Intracranial hemorrhage (HCC). Diagnoses of Syncope, unspecified syncope type and Pre-syncope were also pertinent to this visit.  Past Medical History:  has a past medical history of Hyperlipidemia, Hypertension, and Thyroid disease.  Past Surgical History:  has no past surgical history on file.    Assessment  Body Structures, Functions, Activity Limitations Requiring Skilled Therapeutic Intervention: Decreased functional mobility ;Decreased endurance;Decreased posture;Decreased strength;Decreased balance  Assessment: Pt SBA for bed mobility, CGA for transfers and ambulation. Pt ambulated 50 feet with RW + 70 feet without an AD. Pt reports at baseline not using RW for ambulation with a faster jaylon. Pt experienced dizziness with ambulation with bp taken at 152 / 65. Recommend skilled PT services to address deficits with transfers, gait, functional mobility, strength, and endurance.  Therapy Prognosis: Good  Decision Making: Medium Complexity  Requires PT Follow-Up: Yes  Activity Tolerance  Activity Tolerance: Patient limited by fatigue;Patient limited by endurance  Activity Tolerance Comments: Pt experienced dizziness with ambulation progressing. Pt bp taken 152 / 65 upon finishing ambulation.    Plan  Physical Therapy Plan  General Plan:  (5-6x per week)  Current Treatment Recommendations: Strengthening, Stair training, Patient/Caregiver education & training, Equipment evaluation, education, & procurement, Balance training, Functional mobility training, Endurance training, Gait training, Transfer training, Safety education & training, Home  Good  Sitting - Dynamic: Good;-  Standing - Static: Fair;+  Standing - Dynamic: Fair  Comments: Pt standing balance assessed without an AD for ~ 2 minutes.        AM-PAC - Mobility    AM-PAC Basic Mobility - Inpatient   How much help is needed turning from your back to your side while in a flat bed without using bedrails?: None  How much help is needed moving from lying on your back to sitting on the side of a flat bed without using bedrails?: None  How much help is needed moving to and from a bed to a chair?: A Little  How much help is needed standing up from a chair using your arms?: A Little  How much help is needed walking in hospital room?: A Little  How much help is needed climbing 3-5 steps with a railing?: A Lot  AM-PAC Inpatient Mobility Raw Score : 19  AM-PAC Inpatient T-Scale Score : 45.44  Mobility Inpatient CMS 0-100% Score: 41.77  Mobility Inpatient CMS G-Code Modifier : CK       Goals  Short Term Goals  Time Frame for Short Term Goals: 14 visits  Short Term Goal 1: Pt independent with bed mobility  Short Term Goal 2: Pt independent with transfers without an AD.  Short Term Goal 3: Pt ambulate independently 300 feet without an AD.  Short Term Goal 4: Pt to negotiate 1 stair with handrails and supervision.       Education  Patient Education  Education Given To: Patient;Family  Education Provided: Role of Therapy;Plan of Care  Education Method: Demonstration;Verbal  Barriers to Learning: Other (Comment) (Language)  Education Outcome: Verbalized understanding      Therapy Time   Individual Concurrent Group Co-treatment   Time In 1028         Time Out 1055         Minutes 27         Timed Code Treatment Minutes: 23 Minutes       MARCELINO Mercado  This treatment/evaluation completed by signing SPT. Signing PT agrees with treatment and documentation.

## 2024-08-27 NOTE — PROCEDURES
Final Anesthesia Post-op Assessment    Patient: Aruna Singh  Procedure(s) Performed: COLONOSCOPY WITH BIOPSYUPPER GI ENDOSCOPY  Anesthesia type: MAC    Vitals Value Taken Time   Temp 36.4 °C (97.6 °F) 03/22/23 1240   Pulse 84 03/22/23 1244   Resp 20 03/22/23 1240   SpO2 99 % 03/22/23 1244   /57 03/22/23 1238   Vitals shown include unvalidated device data.      Patient Location: Phase II  Post-op Vital Signs:stable  Level of Consciousness: participates in exam, awake, oriented and alert  Respiratory Status: spontaneous ventilation  Cardiovascular stable  Hydration: euvolemic  Pain Management: well controlled  Vomiting: none  Nausea: None  Airway Patency:patent  Post-op Assessment: awake, alert, appropriately conversant, or baseline, no complications and patient tolerated procedure well with no complications      No notable events documented.    PROCEDURE NOTE  Date: 8/27/2024   Name: Nataliia Lao  YOB: 1940    Procedures    EEG REPORT       Patient: Nataliia Lao Age: 84 y.o.  MRN: [id number]      Referring Provider: No ref. provider found    History: This routine 30 minute scalp EEG was recorded with video- monitoring for a 84 y.o.. male who presented with encephalopathy. This EEG was performed to evaluate for focal and epileptiform abnormalities.     Nataliia Lao   Current Facility-Administered Medications   Medication Dose Route Frequency Provider Last Rate Last Admin    sodium chloride flush 0.9 % injection 5-40 mL  5-40 mL IntraVENous 2 times per day Gio Porras MD   10 mL at 08/26/24 2000    sodium chloride flush 0.9 % injection 5-40 mL  5-40 mL IntraVENous PRN Gio Porras MD        0.9 % sodium chloride infusion   IntraVENous PRN Gio Porras MD        ondansetron (ZOFRAN-ODT) disintegrating tablet 4 mg  4 mg Oral Q8H PRN Gio Porras MD        Or    ondansetron (ZOFRAN) injection 4 mg  4 mg IntraVENous Q6H PRN Gio Porras MD        valsartan (DIOVAN) tablet 20 mg  20 mg Oral Daily Simran Johnson MD   20 mg at 08/26/24 1503    atorvastatin (LIPITOR) tablet 20 mg  20 mg Oral Daily Simran Johnson MD   20 mg at 08/26/24 1503    levothyroxine (SYNTHROID) tablet 150 mcg  150 mcg Oral Daily Simran Johnson MD        sennosides-docusate sodium (SENOKOT-S) 8.6-50 MG tablet 2 tablet  2 tablet Oral Dinner Maria Isabel Escobedo DO   2 tablet at 08/26/24 1752    hydrALAZINE (APRESOLINE) injection 10 mg  10 mg IntraVENous Q6H PRN Maria Isabel Escobedo DO   10 mg at 08/27/24 0559    famotidine (PEPCID) tablet 20 mg  20 mg Oral Daily Maria Isabel Escobedo, DO   20 mg at 08/26/24 1206    dextrose bolus 10% 125 mL  125 mL IntraVENous PRN Simran Johnson MD        Or    dextrose bolus 10% 250 mL  250 mL IntraVENous PRN Simran Johnson MD        glucagon injection 1 mg  1 mg SubCUTAneous PRN Simran Johnson MD        dextrose 10 %  artifact.    Interpretation  This EEG was abnormal due to disorganized and slow background in theta frequency and intermittent bilateral attenuation of faster frequencies.      Clinical correlation  This EEG was abnormal. Disorganized and slow background suggested mild encephalopathy.  There are intermittent bilateral attenuation of faster frequencies is suggestive of underlying focal structural dysfunction or could be nonspecific.  Clinical correlation recommended.     Terence Jo MD  [facility] [place]  Neurology

## 2024-08-27 NOTE — PROGRESS NOTES
Heart station called. When they are done with stress tests they will be bringing the Holter monitor.

## 2024-08-27 NOTE — CONSULTS
[facility] Inpatient Brief Diagnostic Assessment Note  JENNYFER KEEN   8/27/2024    Nataliia Lao  1940  [id number]      Time Spent with Patient: 15 minutes or less (Brief Diagnostic Assessment) 52025    Pt was provided informed consent for the [facility]. Discussed with patient model of service to include the limits of confidentiality (i.e. abuse reporting, suicide intervention, etc.) and short-term intervention focused approach.  Pt indicated understanding.    [facility] Inpatient or Virtual Question: This was not a virtual session    Is consult a Victim of Crime?: No    Presenting Patient Report:  Patient was screened at the request of the Trauma Team.   Patient presents as a 84 y.o. male subsequent to hospital admission following Fall resulting in injury.     Patient denies any current or previous symptoms of depression or anxiety and declines Mental Health interventions at this time.    Patient was able to complete the following screens: ITSS    MSE:     Appearance:   Hospital Gown, Well Groomed, Good Hygiene, and Good Eye Contact  Speech:    spontaneous, normal rate, normal volume, and well-articulated  Affect Observed:   Appropriate to Context  Thought Content:    intact  Thought Process:    linear, goal directed, and coherent  Associations:    logical connections  Insight:    Fair  Judgment:    Intact  Orientation:    oriented to person, place, time, and general circumstances    Patient reports and/or exhibits the following symptoms:    Mood: Appropriate to Context    Cognitive symptoms: Appropriate to Context    Behaviors: Appropriate to Context    Somatic: None Reported      Assessment:  Patient denies any previous or current symptoms for depression or anxiety.  Patient scored low on Injured Trauma Survivor Screen (ITSS).  Patient does not meet criteria for depression or anxiety diagnosis at this time.     Screening Scale Results (If Any):    ITSS:  Date Screen Completed:  08/27/2024  Patient's score= 0 for PTSD risk. ( ? 2 is positive for PTSD risk. )  Patient's score= 0 for depression risk.  ( ? 2 is positive for Depression risk )    Diagnoses: The following Diagnoses are based on currently available information and may change as additional information becomes available.  Observation for suspected mental condition, No diagnosis (Z03.89) [id number]    Patient Interventions:  Brief Diagnostic Assessment     Recommendations:  No outpatient mental health services recommended    Plan:  No plan for bedside follow-up during hospital admission

## 2024-08-27 NOTE — PLAN OF CARE
Problem: Neurosensory - Adult  Goal: Achieves stable or improved neurological status  8/27/2024 1453 by Betty Vsaquez RN  Outcome: Adequate for Discharge  8/27/2024 1027 by Betty Vasquez RN  Outcome: Progressing  8/27/2024 0655 by Kandice Mendez RN  Outcome: Progressing  Goal: Absence of seizures  8/27/2024 1453 by Betty Vasquez RN  Outcome: Adequate for Discharge  8/27/2024 1027 by Betty Vasquez RN  Outcome: Progressing  8/27/2024 0655 by Kandice Mendez RN  Outcome: Adequate for Discharge  Goal: Remains free of injury related to seizures activity  8/27/2024 1453 by Betty Vasquez RN  Outcome: Adequate for Discharge  8/27/2024 1027 by Betty Vasquez RN  Outcome: Progressing  8/27/2024 0655 by Kandice Mendez RN  Outcome: Adequate for Discharge  Goal: Achieves maximal functionality and self care  8/27/2024 1453 by Betty Vasquez RN  Outcome: Adequate for Discharge  8/27/2024 1027 by Betty Vasquez RN  Outcome: Progressing  8/27/2024 0655 by Kandice Mendez RN  Outcome: Progressing     Problem: Cardiovascular - Adult  Goal: Maintains optimal cardiac output and hemodynamic stability  8/27/2024 1453 by Betty Vasquez RN  Outcome: Adequate for Discharge  8/27/2024 1027 by Betty Vasquez RN  Outcome: Progressing  Goal: Absence of cardiac dysrhythmias or at baseline  8/27/2024 1453 by Betty Vasquez RN  Outcome: Adequate for Discharge  8/27/2024 1027 by Betty Vasquez RN  Outcome: Progressing     Problem: Skin/Tissue Integrity - Adult  Goal: Skin integrity remains intact  8/27/2024 1453 by Betty Vasquez RN  Outcome: Adequate for Discharge  8/27/2024 1027 by Betty Vasquez RN  Outcome: Progressing  Goal: Incisions, wounds, or drain sites healing without S/S of infection  8/27/2024 1453 by Betty Vasquez RN  Outcome: Adequate for Discharge  8/27/2024 1027 by Betty Vasquez RN  Outcome: Progressing  Goal: Oral mucous membranes remain intact  8/27/2024 1453 by Betty Vasquez RN  Outcome: Adequate for

## 2024-08-27 NOTE — PLAN OF CARE
Problem: Neurosensory - Adult  Goal: Achieves stable or improved neurological status  8/27/2024 0655 by Kandice Mendez RN  Outcome: Progressing  8/26/2024 1749 by Sejal Shoemaker RN  Outcome: Progressing  Flowsheets (Taken 8/26/2024 0930)  Achieves stable or improved neurological status:   Assess for and report changes in neurological status   Initiate measures to prevent increased intracranial pressure   Maintain blood pressure and fluid volume within ordered parameters to optimize cerebral perfusion and minimize risk of hemorrhage   Monitor temperature, glucose, and sodium. Initiate appropriate interventions as ordered  Goal: Achieves maximal functionality and self care  8/27/2024 0655 by Kandice Mendez RN  Outcome: Progressing  8/26/2024 1749 by Sejal Shoemaker RN  Outcome: Progressing  Flowsheets (Taken 8/26/2024 0930)  Achieves maximal functionality and self care:   Monitor swallowing and airway patency with patient fatigue and changes in neurological status   Encourage and assist patient to increase activity and self care with guidance from physical therapy/occupational therapy   Encourage visually impaired, hearing impaired and aphasic patients to use assistive/communication devices     Problem: Cardiovascular - Adult  Goal: Maintains optimal cardiac output and hemodynamic stability  8/26/2024 1749 by Sejal Shoemaker RN  Outcome: Progressing  Flowsheets (Taken 8/26/2024 0930)  Maintains optimal cardiac output and hemodynamic stability:   Monitor blood pressure and heart rate   Monitor urine output and notify Licensed Independent Practitioner for values outside of normal range   Administer fluid and/or volume expanders as ordered   Administer vasoactive medications as ordered   Assess for signs of decreased cardiac output  Goal: Absence of cardiac dysrhythmias or at baseline  8/26/2024 1749 by Sejal Shoemaker RN  Outcome: Progressing  Flowsheets (Taken 8/26/2024 0930)  Absence of cardiac dysrhythmias  as ordered     Problem: Musculoskeletal - Adult  Goal: Return mobility to safest level of function  8/26/2024 1749 by Sejal Shoemaker RN  Outcome: Progressing  Flowsheets (Taken 8/26/2024 0930)  Return Mobility to Safest Level of Function:   Assess patient stability and activity tolerance for standing, transferring and ambulating with or without assistive devices   Obtain physical therapy/occupational therapy consults as needed   Apply continuous passive motion per provider or physical therapy orders to increase flexion toward goal   Instruct patient/family in ordered activity level   Ensure adequate protection for wounds/incisions during mobilization   Assist with transfers and ambulation using safe patient handling equipment as needed  Goal: Maintain proper alignment of affected body part  8/26/2024 1749 by Sejal Shoemaker RN  Outcome: Progressing  Flowsheets (Taken 8/26/2024 0930)  Maintain proper alignment of affected body part:   Support and protect limb and body alignment per provider's orders   Instruct and reinforce with patient and family use of appropriate assistive device and precautions (e.g. spinal or hip dislocation precautions)  Goal: Return ADL status to a safe level of function  8/26/2024 1749 by Sejal Shoemaker RN  Outcome: Progressing  Flowsheets (Taken 8/26/2024 0930)  Return ADL Status to a Safe Level of Function:   Administer medication as ordered   Assess activities of daily living deficits and provide assistive devices as needed   Assist and instruct patient to increase activity and self care as tolerated   Obtain physical therapy/occupational therapy consults as needed

## 2024-08-27 NOTE — PROGRESS NOTES
CLINICAL PHARMACY NOTE: MEDS TO BEDS    Total # of Prescriptions Filled: 2   The following medications were delivered to the patient:  Keppra 500mg  Acetaminophen 500    Additional Documentation: dropped off to patient in room 105 at 12:30pm on 8/27/24 by santiago castro. Copay was 16.63 and paid by [facility]

## 2024-08-27 NOTE — PROGRESS NOTES
Writer went overr AVS with the patient's grandson. Verbalize understanding of follow up appts and new medications. Personal belongings packed up by the family. PIV removed. Currently waiting on the patient's son to arrive for transportation.

## 2024-08-27 NOTE — PLAN OF CARE
Problem: Neurosensory - Adult  Goal: Achieves stable or improved neurological status  8/27/2024 1027 by Betty Vasquez RN  Outcome: Progressing  8/27/2024 0655 by Kandice Mendez RN  Outcome: Progressing  Goal: Absence of seizures  8/27/2024 1027 by Betty Vasquez RN  Outcome: Progressing  8/27/2024 0655 by Kandice Mendez RN  Outcome: Adequate for Discharge  Goal: Remains free of injury related to seizures activity  8/27/2024 1027 by Betty Vasquez RN  Outcome: Progressing  8/27/2024 0655 by Kandice Mendez RN  Outcome: Adequate for Discharge  Goal: Achieves maximal functionality and self care  8/27/2024 1027 by Betty Vasquez RN  Outcome: Progressing  8/27/2024 0655 by Kandice Mendez RN  Outcome: Progressing     Problem: Cardiovascular - Adult  Goal: Maintains optimal cardiac output and hemodynamic stability  Outcome: Progressing  Goal: Absence of cardiac dysrhythmias or at baseline  Outcome: Progressing     Problem: Skin/Tissue Integrity - Adult  Goal: Skin integrity remains intact  Outcome: Progressing  Goal: Incisions, wounds, or drain sites healing without S/S of infection  Outcome: Progressing  Goal: Oral mucous membranes remain intact  Outcome: Progressing     Problem: Musculoskeletal - Adult  Goal: Return mobility to safest level of function  Outcome: Progressing  Goal: Maintain proper alignment of affected body part  Outcome: Progressing  Goal: Return ADL status to a safe level of function  Outcome: Progressing

## 2024-08-28 NOTE — PROGRESS NOTES
Date of Service: 8/26/24    Procedure: Diagnostic cerebral angiogram     Patient arrived and wheeled in to the angio suite at: 7:29  Monitoring and administration of sedation started at: 7:29  Puncture obtained at: 7:54  Vascular access was removed at: 9:00  Manual pressure for minutes: 10 mins  Moderate sedation ended at: 9:15  Patient wheeled out of the angio suite at: 9:15    Diagnosis: Left parietal occipital AVM     Procedures:  --Right ultrasound guided femoral artery access  --Intravenous moderate sedation  --Selective left common carotid artery (CCA) angiogram   --Selective left internal carotid artery (ICA) cerebral angiogram   --Selective left external carotid artery (ECA) angiogram   --Selective left vertebral artery (VA) cerebral angiogram     Neurointerventionalist: Dr. Gio Porras    [place]:  Cyrus Rapp MD, Frederic Souza MD    Contrast: 74 cc of Visipaque-270.    Fluoroscopy time: 26.1 minutes    Access: Right common femoral artery.    Comparison: none    Consent:   After explaining the risks and benefits to the patient and the patient's family, including but not limited to stroke, coma, death, vessel injury, dissection, tear, occlusion, and X-ray dye allergic type reaction, a signed consent form was obtained.     Indication and Clinical History:   Nataliia Lao is a 84 y.o. male with medical history of L parietal occipital AVM. Pt was referred for a diagnostic angiogram.    Anesthesia:   Local anesthesia with lidocaine. IV moderate sedation with Versed and Fentanyl. IV moderate sedation was supervised by the attending physician. The patient was independently monitored by a registered nurse assigned to the Department of Radiology using   automated blood pressure, EKG and pulse oximetry. The detailed Conscious Record is permanently stored in the Hospital Information System.    Description and findings:   The patient's right groin was prepped and draped in standard sterile fashion and under

## 2024-08-28 NOTE — CARE COORDINATION
Discharge Report    [facility]  Clinical Case Management Department  Written by: Stella Umanzor RN    Patient Name: Nataliia Lao  Attending Provider: No att. providers found  Admit Date: 2024  9:42 AM  MRN: [id number]  Account: [id number]                     : 1940  Discharge Date: 2024      Disposition: home    Stella Umanzor RN

## 2024-09-03 NOTE — PROGRESS NOTES
[facility] ([facility]) Inpatient Discharge Summary  ABAD JENNYFER LEWIS  9/3/2024        Nataliia Lao  1940  [id number]    Date & Time of Discharge: 8/27/2024  5:02 PM     Is consult a Victim of Crime?: No    Services provided:  Screenings: ITSS    Screen Results (If any):      ITSS:  Date Screen Completed: 08/27/2024  Patient's score= 0 for PTSD risk. ( ? 2 is positive for PTSD risk. )  Patient's score= 0 for depression risk.  ( ? 2 is positive for Depression risk )      Discharge Recommendations:  No outpatient mental health services recommended      The therapist may be reached through the [facility] offices at [phone].

## 2024-09-05 LAB — ECHO BSA: 1.79 M2

## 2024-09-05 NOTE — DISCHARGE SUMMARY
DISCHARGE SUMMARY:    PATIENT NAME:  Nataliia Lao  YOB: 1940  MEDICAL RECORD NO. [id number]  DATE: 09/05/24  PRIMARY CARE PHYSICIAN: No primary care provider on file.  ADMIT DATE: 8/23/2024   DISCHARGE DATE: 8/27/2024   DISPOSITION:  Discharged to home  ADMITTING DIAGNOSIS:   SAH (subarachnoid hemorrhage) (HCC) [I60.9]     DIAGNOSIS:   Patient Active Problem List   Diagnosis    SAH (subarachnoid hemorrhage) (HCC)    Intracranial hemorrhage (HCC)    AVM (arteriovenous malformation) brain    Syncope       CONSULTANTS:  Neurosurgery, endovascular neurosurgery, neurology    PROCEDURES:   Diagnostic Cerebral Angiogram 8/26     HOSPITAL COURSE:   Nataliia Lao is a 84 y.o. male who was admitted on 8/23/2024 following a fall at home.  Patient was initially transferred to [facility] where workup revealed a possible subarachnoid/subdural hemorrhage in the setting of an AVM the patient was transported to facility.  Neurosurgery did evaluate this patient did not recommend any surgical interventions.  Neuroendovascular surgery evaluated the patient recommended a DSA.  DSA was done on 8/26 revealing a left parieto-occipital AVM with 2 arterial feeders.  This did not require surgical intervention.    Labs and imaging were followed daily.      At time of discharge, Nataliia Lao was tolerating a regular diet, having bowel movements, ambulating on his own accord, had adequate analgesia on oral pain medications, and had no signs of symptoms of complications.  He was deemed medically stable and discharged to home on 8/27/24 with instructions to follow-up.  Pt expressed understanding of and agreement with DC plans.          PHYSICAL EXAMINATION:        Discharge Vitals:  height is 1.702 m (5' 7\") and weight is 71 kg (156 lb 8.4 oz). His oral temperature is 98.5 °F (36.9 °C). His blood pressure is 142/55 (abnormal) and his pulse is 62. His respiration is 18 and oxygen saturation is 95%.   General

## 2024-09-17 ENCOUNTER — TELEPHONE (OUTPATIENT)
Dept: GASTROENTEROLOGY | Age: 84
End: 2024-09-17

## 2024-09-17 DIAGNOSIS — Z12.11 ENCOUNTER FOR SCREENING COLONOSCOPY: Primary | ICD-10-CM

## 2024-10-15 ENCOUNTER — OFFICE VISIT (OUTPATIENT)
Dept: NEUROSURGERY | Age: 84
End: 2024-10-15
Payer: MEDICAID

## 2024-10-15 VITALS
WEIGHT: 148 LBS | HEART RATE: 75 BPM | BODY MASS INDEX: 23.23 KG/M2 | HEIGHT: 67 IN | DIASTOLIC BLOOD PRESSURE: 72 MMHG | SYSTOLIC BLOOD PRESSURE: 161 MMHG

## 2024-10-15 DIAGNOSIS — Q28.2 AVM (ARTERIOVENOUS MALFORMATION) BRAIN: ICD-10-CM

## 2024-10-15 DIAGNOSIS — G43.109 MIGRAINE WITH AURA AND WITHOUT STATUS MIGRAINOSUS, NOT INTRACTABLE: Primary | ICD-10-CM

## 2024-10-15 PROCEDURE — 1123F ACP DISCUSS/DSCN MKR DOCD: CPT | Performed by: NEUROLOGICAL SURGERY

## 2024-10-15 PROCEDURE — 99214 OFFICE O/P EST MOD 30 MIN: CPT | Performed by: NEUROLOGICAL SURGERY

## 2024-10-15 RX ORDER — LEVETIRACETAM 500 MG/1
500 TABLET ORAL 2 TIMES DAILY
Qty: 120 TABLET | Refills: 1 | Status: SHIPPED | OUTPATIENT
Start: 2024-10-15 | End: 2025-02-12

## 2024-10-15 RX ORDER — CANDESARTAN CILEXETIL AND HYDROCHLOROTHIAZIDE 16; 12.5 MG/1; MG/1
1 TABLET ORAL DAILY
COMMUNITY
Start: 2024-09-10

## 2024-10-15 RX ORDER — CHLORAL HYDRATE 500 MG
1 CAPSULE ORAL DAILY
COMMUNITY
Start: 2024-09-20

## 2024-10-15 NOTE — PROGRESS NOTES
recognition software. There may be inaccuracies of transcription  that are inadvertently overlooked prior to the signature.  There is any questions about the transcription please contact me.

## 2024-10-24 ENCOUNTER — HOSPITAL ENCOUNTER (OUTPATIENT)
Dept: MRI IMAGING | Age: 84
Discharge: HOME | End: 2024-10-26
Attending: NEUROLOGICAL SURGERY

## 2024-10-24 DIAGNOSIS — Q28.2 AVM (ARTERIOVENOUS MALFORMATION) BRAIN: ICD-10-CM

## 2024-10-24 LAB
CREAT SERPL-MCNC: 1.2 MG/DL (ref 0.7–1.2)
GFR, ESTIMATED: 59 ML/MIN/1.73M2

## 2024-10-24 PROCEDURE — 36415 COLL VENOUS BLD VENIPUNCTURE: CPT

## 2024-10-24 PROCEDURE — 82565 ASSAY OF CREATININE: CPT

## 2024-11-01 ENCOUNTER — HOSPITAL ENCOUNTER (OUTPATIENT)
Dept: MRI IMAGING | Age: 84
Discharge: HOME | End: 2024-11-03
Attending: NEUROLOGICAL SURGERY

## 2024-11-01 PROCEDURE — 6360000004 HC RX CONTRAST MEDICATION: Performed by: NEUROLOGICAL SURGERY

## 2024-11-01 PROCEDURE — A9579 GAD-BASE MR CONTRAST NOS,1ML: HCPCS | Performed by: NEUROLOGICAL SURGERY

## 2024-11-01 PROCEDURE — 70553 MRI BRAIN STEM W/O & W/DYE: CPT

## 2024-11-01 RX ADMIN — GADOTERIDOL 12 ML: 279.3 INJECTION, SOLUTION INTRAVENOUS at 08:15

## 2024-11-29 ENCOUNTER — OFFICE VISIT (OUTPATIENT)
Dept: NEUROLOGY | Age: 84
End: 2024-11-29

## 2024-11-29 VITALS
HEART RATE: 65 BPM | BODY MASS INDEX: 23.23 KG/M2 | DIASTOLIC BLOOD PRESSURE: 69 MMHG | HEIGHT: 67 IN | SYSTOLIC BLOOD PRESSURE: 142 MMHG | WEIGHT: 148 LBS

## 2024-11-29 DIAGNOSIS — Q28.2 AVM (ARTERIOVENOUS MALFORMATION) BRAIN: Primary | ICD-10-CM

## 2024-11-29 PROBLEM — E78.5 HYPERLIPIDEMIA: Status: ACTIVE | Noted: 2019-10-05

## 2024-11-29 PROBLEM — E03.9 HYPOTHYROIDISM: Status: ACTIVE | Noted: 2019-10-05

## 2024-11-29 PROBLEM — I10 ESSENTIAL HYPERTENSION: Status: ACTIVE | Noted: 2019-10-05

## 2024-11-29 NOTE — PROGRESS NOTES
and tingling  negative for headaches and dizziness    PSYCHIATRIC:  negative for anxiety      Review of systems otherwise negative.      OBJECTIVE:     Vitals:    24 1218   BP: (!) 142/69   Pulse: 65        General:  Gen: normal habitus, NAD  HEENT: NCAT, mucosa moist  Cvs: RRR, S1 S2 normal  Resp: symmetric unlabored breathing  Abd: s/nd/nt  Ext: no edema  Skin: no lesions seen, warm and dry    Neuro:  Gen: awake and alert, oriented x3.   Lang/speech: no aphasia or dysarthria. Follows commands.  CN: PERRL, EOMI, VFF, V1-3 intact, face symmetric, hearing intact, shoulder shrug symmetric, tongue midline  Motor: grossly 5/5 UE and LE b/l  Sense: LT intact in all 4 ext.  Coord: FTN and HTS intact b/l  DTR: deferred  Gait: narrow base gait    NIH Stroke Scale:   1a  Level of consciousness: 0 - alert; keenly responsive   1b. LOC questions:  0 - answers both questions correctly   1c. LOC commands: 0 - performs both tasks correctly   2.  Best Gaze: 0 - normal   3. Visual: 0 - no visual loss   4. Facial Palsy: 0 - normal symmetric movement   5a. Motor left arm: 0 - no drift, limb holds 90 (or 45) degrees for full 10 seconds   5b.  Motor right arm: 0 - no drift, limb holds 90 (or 45) degrees for full 10 seconds   6a. Motor left le - no drift; leg holds 30 degree position for full 5 seconds   6b  Motor right le - no drift; leg holds 30 degree position for full 5 seconds   7. Limb Ataxia: 0 - absent   8.  Sensory: 0 - normal; no sensory loss   9. Best Language:  0 - no aphasia, normal   10. Dysarthria: 0 - normal   11. Extinction and Inattention: 0 - no abnormality         Total:   0     MRS: 0      LABS:   Reviewed.  Lab Results   Component Value Date    HGB 14.9 2024    WBC 3.3 (L) 2024     (L) 2024     2024    BUN 12 2024    CREATININE 1.2 10/24/2024    AST 20 2024    ALT 22 2024    APTT 25.4 2024    INR 1.1 2024      No results found for:

## 2024-12-02 ENCOUNTER — OFFICE VISIT (OUTPATIENT)
Dept: GASTROENTEROLOGY | Age: 84
End: 2024-12-02

## 2024-12-02 ENCOUNTER — PREP FOR PROCEDURE (OUTPATIENT)
Dept: GASTROENTEROLOGY | Age: 84
End: 2024-12-02

## 2024-12-02 VITALS
BODY MASS INDEX: 22.6 KG/M2 | WEIGHT: 144 LBS | TEMPERATURE: 97 F | RESPIRATION RATE: 18 BRPM | SYSTOLIC BLOOD PRESSURE: 126 MMHG | HEART RATE: 67 BPM | HEIGHT: 67 IN | DIASTOLIC BLOOD PRESSURE: 51 MMHG

## 2024-12-02 DIAGNOSIS — K92.1 HEMATOCHEZIA: Primary | ICD-10-CM

## 2024-12-02 DIAGNOSIS — R14.0 BLOATING: ICD-10-CM

## 2024-12-02 DIAGNOSIS — R10.13 DYSPEPSIA: ICD-10-CM

## 2024-12-02 DIAGNOSIS — K59.04 CHRONIC IDIOPATHIC CONSTIPATION: ICD-10-CM

## 2024-12-02 PROCEDURE — 3078F DIAST BP <80 MM HG: CPT | Performed by: STUDENT IN AN ORGANIZED HEALTH CARE EDUCATION/TRAINING PROGRAM

## 2024-12-02 PROCEDURE — 1123F ACP DISCUSS/DSCN MKR DOCD: CPT | Performed by: STUDENT IN AN ORGANIZED HEALTH CARE EDUCATION/TRAINING PROGRAM

## 2024-12-02 PROCEDURE — 3074F SYST BP LT 130 MM HG: CPT | Performed by: STUDENT IN AN ORGANIZED HEALTH CARE EDUCATION/TRAINING PROGRAM

## 2024-12-02 PROCEDURE — 99204 OFFICE O/P NEW MOD 45 MIN: CPT | Performed by: STUDENT IN AN ORGANIZED HEALTH CARE EDUCATION/TRAINING PROGRAM

## 2024-12-02 RX ORDER — POLYETHYLENE GLYCOL 3350, SODIUM SULFATE ANHYDROUS, SODIUM BICARBONATE, SODIUM CHLORIDE, POTASSIUM CHLORIDE 236; 22.74; 6.74; 5.86; 2.97 G/4L; G/4L; G/4L; G/4L; G/4L
4 POWDER, FOR SOLUTION ORAL ONCE
Qty: 4000 ML | Refills: 0 | Status: SHIPPED | OUTPATIENT
Start: 2024-12-02 | End: 2024-12-02

## 2024-12-02 RX ORDER — SODIUM, POTASSIUM,MAG SULFATES 17.5-3.13G
SOLUTION, RECONSTITUTED, ORAL ORAL
Qty: 1 EACH | Refills: 0 | Status: CANCELLED | OUTPATIENT
Start: 2024-12-02

## 2024-12-02 NOTE — TELEPHONE ENCOUNTER
Procedure scheduled/Dr Spaulding  Procedure:COLON  Dx: HEMATABDIEL  Date:03/18/2025  Time:730 AM  Hospital:[facility] phone call:TBD  Bowel Prep instructions given:SUPREP  In office/via phone: IN OFFICE  Clearance needed:NONE

## 2024-12-02 NOTE — PROGRESS NOTES
120 tablet, Rfl: 1    acetaminophen (TYLENOL) 500 MG tablet, Take 1 tablet by mouth every 8 hours as needed for Pain, Disp: 120 tablet, Rfl: 3    sennosides-docusate sodium (SENOKOT-S) 8.6-50 MG tablet, Take 2 tablets by mouth daily, Disp: , Rfl:     aspirin 81 MG EC tablet, Take 1 tablet by mouth daily, Disp: , Rfl:     levothyroxine (SYNTHROID) 150 MCG tablet, Take 1 tablet by mouth Daily, Disp: , Rfl:     atorvastatin (LIPITOR) 20 MG tablet, Take 1 tablet by mouth daily, Disp: , Rfl:     metFORMIN (GLUCOPHAGE-XR) 500 MG extended release tablet, Take 1 tablet by mouth daily (with breakfast), Disp: , Rfl:     felodipine (PLENDIL) 10 MG extended release tablet, Take 1 tablet by mouth daily, Disp: , Rfl:     metoprolol succinate (TOPROL XL) 25 MG extended release tablet, Take 1 tablet by mouth daily, Disp: , Rfl:     ALLERGIES:   No Known Allergies    FAMILY HISTORY:   No family history on file.    SOCIAL HISTORY:   Social History     Socioeconomic History    Marital status:      Spouse name: Not on file    Number of children: Not on file    Years of education: Not on file    Highest education level: Not on file   Occupational History    Not on file   Tobacco Use    Smoking status: Former     Types: Cigarettes     Passive exposure: Never    Smokeless tobacco: Never   Vaping Use    Vaping status: Never Used   Substance and Sexual Activity    Alcohol use: Not on file    Drug use: Never    Sexual activity: Defer   Other Topics Concern    Not on file   Social History Narrative    Not on file     Social Determinants of Health     Financial Resource Strain: Not on file   Food Insecurity: No Food Insecurity (8/23/2024)    Hunger Vital Sign     Worried About Running Out of Food in the Last Year: Never true     Ran Out of Food in the Last Year: Never true   Transportation Needs: No Transportation Needs (8/23/2024)    PRAPARE - Transportation     Lack of Transportation (Medical): No     Lack of Transportation

## 2024-12-24 NOTE — TELEPHONE ENCOUNTER
Incoming fax requesting refill for Keppra. Please review and e-scribe if applicable.     Last Visit Date: 10/15/2024    Next Visit Date:01/28/2025

## 2024-12-25 RX ORDER — LEVETIRACETAM 500 MG/1
500 TABLET ORAL 2 TIMES DAILY
Qty: 120 TABLET | Refills: 1 | Status: SHIPPED | OUTPATIENT
Start: 2024-12-25 | End: 2025-04-24

## 2025-01-28 ENCOUNTER — OFFICE VISIT (OUTPATIENT)
Dept: NEUROSURGERY | Age: 85
End: 2025-01-28

## 2025-01-28 VITALS
WEIGHT: 140 LBS | HEART RATE: 65 BPM | SYSTOLIC BLOOD PRESSURE: 160 MMHG | DIASTOLIC BLOOD PRESSURE: 62 MMHG | HEIGHT: 67 IN | BODY MASS INDEX: 21.97 KG/M2

## 2025-01-28 DIAGNOSIS — G96.08 SUBDURAL HYGROMA: ICD-10-CM

## 2025-01-28 DIAGNOSIS — Q28.2 AVM (ARTERIOVENOUS MALFORMATION) BRAIN: ICD-10-CM

## 2025-01-28 DIAGNOSIS — G43.109 MIGRAINE WITH AURA AND WITHOUT STATUS MIGRAINOSUS, NOT INTRACTABLE: Primary | ICD-10-CM

## 2025-01-28 PROCEDURE — 1123F ACP DISCUSS/DSCN MKR DOCD: CPT | Performed by: NEUROLOGICAL SURGERY

## 2025-01-28 PROCEDURE — 99214 OFFICE O/P EST MOD 30 MIN: CPT | Performed by: NEUROLOGICAL SURGERY

## 2025-03-13 ENCOUNTER — HOSPITAL ENCOUNTER (OUTPATIENT)
Dept: PREADMISSION TESTING | Age: 85
Discharge: HOME | End: 2025-03-17

## 2025-03-13 VITALS — BODY MASS INDEX: 21.97 KG/M2 | HEIGHT: 67 IN | WEIGHT: 140 LBS

## 2025-03-13 NOTE — PROGRESS NOTES
should speak with them after your surgery.    After surgery, you will be taken to the recovery area.  When you are alert and stable, you will receive instructions and be prepared for discharge.       [facility] (back of the building) [address] [place] [address]   -there is a flag pole in the circular drive. That is where you will come in and check in at the desk.       INSTRUCTIONS REVIEWED WITH TERRI (SON). VERBALIZED UNDERSTANDING.   COLONOSCOPY WITH FRANKLIN 3/18/25 @ 7341.  Sent instructions via email and home address.

## 2025-03-14 NOTE — PRE-PROCEDURE INSTRUCTIONS
Have you received your Prep? Any questions with prep instructions?  Only Clear Liquid Diet day before.  Nothing to eat after midnight day before procedure.  Are you taking any blood thinners? If so, you need to Stop.  Remove any jewelry and body piercings.  Do you wear glasses? If so, please bring a case to store them in.  Are you having any Covid symptoms?  Do you have any new rashes, infections, etc. that we should be aware of?  Do you have a ride home the day of surgery? It cannot be a cab or medical transportation.  Verify surgery time/date and what time to arrive at hospital.   Pts son states all instructions were gone over yesterday with the nurse  and there are no questions.

## 2025-03-17 ENCOUNTER — ANESTHESIA EVENT (OUTPATIENT)
Dept: ENDOSCOPY | Age: 85
End: 2025-03-17

## 2025-03-18 ENCOUNTER — HOSPITAL ENCOUNTER (OUTPATIENT)
Age: 85
Setting detail: OUTPATIENT SURGERY
Discharge: HOME | End: 2025-03-18
Attending: STUDENT IN AN ORGANIZED HEALTH CARE EDUCATION/TRAINING PROGRAM | Admitting: STUDENT IN AN ORGANIZED HEALTH CARE EDUCATION/TRAINING PROGRAM

## 2025-03-18 ENCOUNTER — ANESTHESIA (OUTPATIENT)
Dept: ENDOSCOPY | Age: 85
End: 2025-03-18

## 2025-03-18 VITALS
TEMPERATURE: 97.1 F | RESPIRATION RATE: 16 BRPM | DIASTOLIC BLOOD PRESSURE: 79 MMHG | WEIGHT: 140 LBS | BODY MASS INDEX: 21.97 KG/M2 | SYSTOLIC BLOOD PRESSURE: 126 MMHG | OXYGEN SATURATION: 100 % | HEIGHT: 67 IN | HEART RATE: 54 BPM

## 2025-03-18 DIAGNOSIS — K92.1 HEMATOCHEZIA: ICD-10-CM

## 2025-03-18 LAB — GLUCOSE BLD-MCNC: 99 MG/DL (ref 75–110)

## 2025-03-18 PROCEDURE — 3700000001 HC ADD 15 MINUTES (ANESTHESIA): Performed by: STUDENT IN AN ORGANIZED HEALTH CARE EDUCATION/TRAINING PROGRAM

## 2025-03-18 PROCEDURE — 6360000002 HC RX W HCPCS: Performed by: NURSE ANESTHETIST, CERTIFIED REGISTERED

## 2025-03-18 PROCEDURE — 3700000000 HC ANESTHESIA ATTENDED CARE: Performed by: STUDENT IN AN ORGANIZED HEALTH CARE EDUCATION/TRAINING PROGRAM

## 2025-03-18 PROCEDURE — 82947 ASSAY GLUCOSE BLOOD QUANT: CPT

## 2025-03-18 PROCEDURE — 88305 TISSUE EXAM BY PATHOLOGIST: CPT

## 2025-03-18 PROCEDURE — 7100000011 HC PHASE II RECOVERY - ADDTL 15 MIN: Performed by: STUDENT IN AN ORGANIZED HEALTH CARE EDUCATION/TRAINING PROGRAM

## 2025-03-18 PROCEDURE — 3609010600 HC COLONOSCOPY POLYPECTOMY SNARE/COLD BIOPSY: Performed by: STUDENT IN AN ORGANIZED HEALTH CARE EDUCATION/TRAINING PROGRAM

## 2025-03-18 PROCEDURE — C1889 IMPLANT/INSERT DEVICE, NOC: HCPCS | Performed by: STUDENT IN AN ORGANIZED HEALTH CARE EDUCATION/TRAINING PROGRAM

## 2025-03-18 PROCEDURE — 2709999900 HC NON-CHARGEABLE SUPPLY: Performed by: STUDENT IN AN ORGANIZED HEALTH CARE EDUCATION/TRAINING PROGRAM

## 2025-03-18 PROCEDURE — 7100000010 HC PHASE II RECOVERY - FIRST 15 MIN: Performed by: STUDENT IN AN ORGANIZED HEALTH CARE EDUCATION/TRAINING PROGRAM

## 2025-03-18 PROCEDURE — 2580000003 HC RX 258: Performed by: ANESTHESIOLOGY

## 2025-03-18 PROCEDURE — 6360000002 HC RX W HCPCS: Performed by: ANESTHESIOLOGY

## 2025-03-18 DEVICE — WORKING LENGTH 235CM, WORKING CHANNEL 2.8MM
Type: IMPLANTABLE DEVICE | Site: RECTUM | Status: FUNCTIONAL
Brand: RESOLUTION 360 CLIP

## 2025-03-18 RX ORDER — HYDROCORTISONE ACETATE 25 MG/1
25 SUPPOSITORY RECTAL EVERY 12 HOURS
Qty: 28 SUPPOSITORY | Refills: 0 | Status: SHIPPED | OUTPATIENT
Start: 2025-03-18

## 2025-03-18 RX ORDER — SODIUM CHLORIDE 9 MG/ML
INJECTION, SOLUTION INTRAVENOUS PRN
Status: DISCONTINUED | OUTPATIENT
Start: 2025-03-18 | End: 2025-03-18 | Stop reason: HOSPADM

## 2025-03-18 RX ORDER — LIDOCAINE HYDROCHLORIDE 10 MG/ML
1 INJECTION, SOLUTION EPIDURAL; INFILTRATION; INTRACAUDAL; PERINEURAL
Status: COMPLETED | OUTPATIENT
Start: 2025-03-18 | End: 2025-03-18

## 2025-03-18 RX ORDER — PROPOFOL 10 MG/ML
INJECTION, EMULSION INTRAVENOUS
Status: DISCONTINUED | OUTPATIENT
Start: 2025-03-18 | End: 2025-03-18 | Stop reason: SDUPTHER

## 2025-03-18 RX ORDER — SODIUM CHLORIDE, SODIUM LACTATE, POTASSIUM CHLORIDE, CALCIUM CHLORIDE 600; 310; 30; 20 MG/100ML; MG/100ML; MG/100ML; MG/100ML
INJECTION, SOLUTION INTRAVENOUS CONTINUOUS
Status: DISCONTINUED | OUTPATIENT
Start: 2025-03-18 | End: 2025-03-18 | Stop reason: HOSPADM

## 2025-03-18 RX ORDER — SODIUM CHLORIDE 0.9 % (FLUSH) 0.9 %
5-40 SYRINGE (ML) INJECTION PRN
Status: DISCONTINUED | OUTPATIENT
Start: 2025-03-18 | End: 2025-03-18 | Stop reason: HOSPADM

## 2025-03-18 RX ORDER — SODIUM CHLORIDE 0.9 % (FLUSH) 0.9 %
5-40 SYRINGE (ML) INJECTION EVERY 12 HOURS SCHEDULED
Status: DISCONTINUED | OUTPATIENT
Start: 2025-03-18 | End: 2025-03-18 | Stop reason: HOSPADM

## 2025-03-18 RX ADMIN — PROPOFOL 120 MCG/KG/MIN: 10 INJECTION, EMULSION INTRAVENOUS at 08:02

## 2025-03-18 RX ADMIN — SODIUM CHLORIDE, POTASSIUM CHLORIDE, SODIUM LACTATE AND CALCIUM CHLORIDE: 600; 310; 30; 20 INJECTION, SOLUTION INTRAVENOUS at 07:03

## 2025-03-18 RX ADMIN — PROPOFOL 50 MG: 10 INJECTION, EMULSION INTRAVENOUS at 08:01

## 2025-03-18 RX ADMIN — LIDOCAINE HYDROCHLORIDE 1 ML: 10 INJECTION, SOLUTION EPIDURAL; INFILTRATION; INTRACAUDAL; PERINEURAL at 07:03

## 2025-03-18 ASSESSMENT — PAIN - FUNCTIONAL ASSESSMENT
PAIN_FUNCTIONAL_ASSESSMENT: 0-10
PAIN_FUNCTIONAL_ASSESSMENT: 0-10

## 2025-03-18 ASSESSMENT — ENCOUNTER SYMPTOMS
SHORTNESS OF BREATH: 0
NAUSEA: 0
ABDOMINAL PAIN: 0
SINUS PRESSURE: 0

## 2025-03-18 NOTE — ANESTHESIA PRE PROCEDURE
Department of Anesthesiology  Preprocedure Note       Name:  Nataliia Lao   Age:  85 y.o.  :  1940                                          MRN:  [id number]         Date:  3/18/2025      Surgeon: Surgeon(s):  Chase Spaulding MD    Procedure: Procedure(s):  COLORECTAL CANCER SCREENING, NOT HIGH RISK    Medications prior to admission:   Prior to Admission medications    Medication Sig Start Date End Date Taking? Authorizing Provider   levETIRAcetam (KEPPRA) 500 MG tablet Take 1 tablet by mouth 2 times daily 24 Yes Kimberly Harris DO   Omega-3 1000 MG CAPS Take 1 capsule by mouth daily 24  Yes ProviderStacy MD   candesartan-hydroCHLOROthiazide (ATACAND HCT) 16-12.5 MG per tablet Take 1 tablet by mouth daily 9/10/24  Yes ProviderStacy MD   acetaminophen (TYLENOL) 500 MG tablet Take 1 tablet by mouth every 8 hours as needed for Pain 24  Yes Francisca Zaragoza DO   levothyroxine (SYNTHROID) 150 MCG tablet Take 1 tablet by mouth Daily   Yes ProviderStacy MD   atorvastatin (LIPITOR) 20 MG tablet Take 1 tablet by mouth daily   Yes ProviderStacy MD   metFORMIN (GLUCOPHAGE-XR) 500 MG extended release tablet Take 1 tablet by mouth daily (with breakfast)   Yes Provider, MD Stacy   felodipine (PLENDIL) 10 MG extended release tablet Take 1 tablet by mouth daily   Yes ProviderStacy MD   metoprolol succinate (TOPROL XL) 25 MG extended release tablet Take 1 tablet by mouth daily   Yes ProviderStacy MD   diclofenac sodium (VOLTAREN) 1 % GEL apply FOUR GRAM topically TO knees and shoulders AS NEEDED 24   Stacy Pathak MD   sennosides-docusate sodium (SENOKOT-S) 8.6-50 MG tablet Take 2 tablets by mouth daily    Stacy Pathak MD   aspirin 81 MG EC tablet Take 1 tablet by mouth daily    ProviderStacy MD       Current medications:    Current Facility-Administered Medications   Medication Dose Route Frequency Provider Last Rate

## 2025-03-18 NOTE — H&P
HISTORY and PHYSICAL  [facility]       NAME:  Nataliia Lao  MRN: [id number]   YOB: 1940   Date: 3/18/2025   Age: 85 y.o.  Gender: male       COMPLAINT AND PRESENT HISTORY:           Nataliia Lao is 85 y.o.,   male, having a diagnostic Colonoscopy.      Patient is being seen for hx of : Pre-Op Diagnosis Codes:      * Hematochezia  This is patient's first Colonoscopy.      Complaints of abdominal pain : no   Changes in bowel habits : yes complains more of constipation and is taking stool softner  .     no diarrhea  blood in stools : yes        BRBPR       hx of hemorrhoids : yes   Changes in appetite :  no .      Nausea  no   , vomiting no.    Heartburn, indigestion or acid reflux :  no         Family Hx of colon cancer:  no    Medical history reviewed. Htn,hld, thyroid, syncope - last time Aug 2024. . Pt on Keppra for this and took this am.  Patient denies any recent fever or chills, chest pain/pressure/palpitations.  Pt reports no SOB.      blood thinners :  aspirin, voltaren    Patient states  has completed and followed prescribed prep with clear outcome.       Any personal or family problems with anesthesia : no    RECENT LABS, IMAGING AND TESTING     Lab Results   Component Value Date    WBC 3.3 (L) 08/27/2024    RBC 5.26 08/27/2024    HGB 14.9 08/27/2024    HCT 46.1 08/27/2024    MCV 87.6 08/27/2024    MCH 28.3 08/27/2024    MCHC 32.3 08/27/2024    RDW 13.0 08/27/2024     (L) 08/27/2024    MPV 10.8 08/27/2024        Lab Results   Component Value Date     08/27/2024    K 3.8 08/27/2024     (H) 08/27/2024    CO2 22 08/27/2024    BUN 12 08/27/2024    CREATININE 1.2 10/24/2024    GLUCOSE 99 08/27/2024    CALCIUM 8.8 08/27/2024    BILITOT 0.6 08/23/2024    ALKPHOS 53 08/23/2024    AST 20 08/23/2024    ALT 22 08/23/2024         PAST MEDICAL HISTORY     Past Medical History:   Diagnosis Date    Hyperlipidemia     Hypertension     Syncope     last episode:

## 2025-03-18 NOTE — DISCHARGE INSTR - DIET
Good nutrition is important when healing from an illness, injury, or surgery.  Follow any nutrition recommendations given to you during your hospital stay.   If you were given an oral nutrition supplement while in the hospital, continue to take this supplement at home.  You can take it with meals, in-between meals, and/or before bedtime. These supplements can be purchased at most local grocery stores, pharmacies, and chain [employer]stores.   If you have any questions about your diet or nutrition, call the hospital and ask for the dietitian.        High-Fiber Diet     What Is Fiber?   Dietary fiber is a form of carbohydrate found in plants that cannot be digested by humans. All plants contain fiber, including fruits, vegetables, grains, and legumes. Fiber is often classified into two categories: soluble and insoluble.   Soluble fiber draws water into the bowel and can help slow digestion. Examples of foods that are high in soluble fiber include oatmeal, oat bran, barley, legumes (eg, beans and peas), apples, and strawberries.   Insoluble fiber speeds digestion and can add bulk to the stool. Examples of foods that are high in insoluble fiber include whole-wheat products, wheat bran, cauliflower, green beans, and potatoes.   Why Follow a High-Fiber Diet?   A high-fiber diet is often recommended to prevent and treat constipation , hemorrhoids , diverticulitis , and irritable bowel syndrome . Eating a high-fiber diet can also help improve your cholesterol levels, lower your risk of coronary heart disease , reduce your risk of type 2 diabetes , and lower your weight. For people with type 1 or 2 diabetes, a high-fiber diet can also help stabilize blood sugar levels.   How Much Fiber Should I Eat?   A high-fiber diet should contain  20-35 grams  of fiber a day. This is actually the amount recommended for the general adult population; however, most Americans eat only 15 grams of fiber per day.   Digestion of Fiber   Eating a

## 2025-03-18 NOTE — ANESTHESIA POSTPROCEDURE EVALUATION
Department of Anesthesiology  Postprocedure Note    Patient: Nataliia Lao  MRN: [id number]  YOB: 1940  Date of evaluation: 3/18/2025    Procedure Summary       Date: 03/18/25 Room / Location: [place] [id number] / [facility]    Anesthesia Start: 0757 Anesthesia Stop: 0901    Procedure: COLONOSCOPY POLYPECTOMY SNARE/BIOPSY Diagnosis:       Hematochezia      (Hematochezia [K92.1])    Surgeons: Chase Spaulding MD Responsible Provider: Fay Garrett MD    Anesthesia Type: General ASA Status: 3            Anesthesia Type: General    Enrike Phase I: Enrike Score: 10    Enrike Phase II: Enrike Score: 10    Anesthesia Post Evaluation    Comments: POST- ANESTHESIA EVALUATION       Pt Name: Nataliia Lao  MRN: [id number]  YOB: 1940  Date of evaluation: 3/18/2025  Time:  10:40 AM      /79   Pulse 54   Temp 97.1 °F (36.2 °C)   Resp 16   Ht 1.702 m (5' 7.01\")   Wt 63.5 kg (140 lb)   SpO2 100%   BMI 21.92 kg/m²      Consciousness Level  Awake  Cardiopulmonary Status  Stable  Pain Adequately Treated YES  Nausea / Vomiting  NO  Adequate Hydration  YES  Anesthesia Related Complications NONE      Electronically signed by Fay Garrett MD on 3/18/2025 at 10:40 AM      No notable events documented.

## 2025-03-18 NOTE — OP NOTE
those of syntax and sound a like substitutions which may escape proofreading.  It such instances, actual meaning can be extrapolated by contextual diversion.

## 2025-03-18 NOTE — DISCHARGE INSTRUCTIONS
with inadequate fluid intake. This is very prevalent in the industrialized world where we eat a lot of processed, low fiber foods.  Diverticuli develop due to firm stool and high pressures in the colon, along with secondary spasm, which causes outpouchings to occus where the small arteries penetrate the wall of the colon to feed the internal lining.  These occur most commonly on the left side and lower portions of the colon.  Diverticuli can then cause bleeding from the arteries at these sites of weakness when they rupture or the diveritculi can get blocked with stool and debris and become obstructed, causing diverticulosis, which is due to an infection.  When these are infected, diverticulitis, it is often treated with antibiotics and bowel rest, but when severe, recurrent, or if rupture of the colon occurs, it may require surgery.  Following appropriate dietary changes and taking the proper precautions is therefore very important.    DIET:  You should increase your dietary fiber intake and take a fiber supplement twice a day.  Make sure that you are taking a supplement that is just fiber and is not a laxative, which should be noted on the package.  Starting a fiber supplement may cause increased gas, more frequent bowel movements, and distension at first but this should improve after a couple of weeks.  Try to eat whole wheat breads and pasta, more fruits and vegetables, along with brown rice and plenty of fluids.  Avoid small undigestible food items that could get stuck in these outpouchings, such as unpopped popcorn kernals, whole corn, small undigestible seeds, etc..    ACTIVITY:  Exercise is also a great way to prevent constipation and is encouraged.  It may also help prevent progression of your diverticulosis.  Always make sure you take in plenty of fluids when exercising.    MEDICATIONS:  Take an over-the-counter fiber supplement as noted above twice daily.  If your symptoms don't improve with fiber and

## 2025-03-19 LAB — SURGICAL PATHOLOGY REPORT: NORMAL

## 2025-04-03 NOTE — PROGRESS NOTES
Reason for Referral:     No referring provider defined for this encounter.    Chief Complaint   Patient presents with    Follow-up     Colon 3/18/25    Constipation       1. Internal hemorrhoid    2. Bicytopenia    3. Flatulence        HISTORY OF PRESENT ILLNESS: Mr.Mahgoub Lao is a 85 y.o. male with a past history remarkable for fall 9/2024 with large L SDH and AVM no interventions, follows post colonoscopy for BRBPR showing internal hemorrhoids and polyps.     4/4/2025: Colonoscopy showed internal hemorrhoids and multiple polyps.  anusol supp was not covered by insurance so they didn't get it. He has not had any more bleeds. He is on Dulcolax which is controlling the constipation.  He has been having problem with some flatulence for years.  He avoids lactose that might contribute to it.    12/2/2024: Three months ago patient had 1 large hard bowel movement due to his constipation after which she developed small amount of hematochezia.  It lasted 2 days with every bowel movement then it went away after taking docusate.  In the last few days hematochezia has recurred. Patient has problem with chronic constipation.  He tried Metamucil however did not like the fact that he was in a powder form.  He tries docusate as needed that helps.  He also has problem with bloating.Patient also interested in being checked for H. pylori.     Family medical history  No colon cancer in the first-degree relatives    Previous Endoscopies  3/18/25 colono  6 polyps in transverse 5 to 8 mm-cold snare and cold forceps TA and HP  3 polyps 4 mm in sigmoid-cold forceps HP  Mild sigmoid diverticulosis  Melanosis coli  Medium external and large internal hemorrhoids likely source of BRBPR    Previous GI workup     Patient's PMH/PSH,SH,PSYCH Hx, MEDs, ALLERGIES, and ROS were all reviewed and updated in the appropriate sections.  I did review all the labs results available for the labs which were ordered by the primary care physician,  Catheter is repositioned to the RIGHT LOWER LEG.

## 2025-04-04 ENCOUNTER — OFFICE VISIT (OUTPATIENT)
Dept: GASTROENTEROLOGY | Age: 85
End: 2025-04-04

## 2025-04-04 VITALS
HEIGHT: 67 IN | RESPIRATION RATE: 18 BRPM | SYSTOLIC BLOOD PRESSURE: 137 MMHG | OXYGEN SATURATION: 99 % | HEART RATE: 52 BPM | WEIGHT: 138 LBS | DIASTOLIC BLOOD PRESSURE: 47 MMHG | BODY MASS INDEX: 21.66 KG/M2

## 2025-04-04 DIAGNOSIS — D75.89 BICYTOPENIA: ICD-10-CM

## 2025-04-04 DIAGNOSIS — K64.8 INTERNAL HEMORRHOID: Primary | ICD-10-CM

## 2025-04-04 DIAGNOSIS — R14.3 FLATULENCE: ICD-10-CM

## 2025-04-04 PROCEDURE — 99214 OFFICE O/P EST MOD 30 MIN: CPT | Performed by: STUDENT IN AN ORGANIZED HEALTH CARE EDUCATION/TRAINING PROGRAM

## 2025-04-04 PROCEDURE — 1123F ACP DISCUSS/DSCN MKR DOCD: CPT | Performed by: STUDENT IN AN ORGANIZED HEALTH CARE EDUCATION/TRAINING PROGRAM

## 2025-04-04 RX ORDER — HYDROCORTISONE 25 MG/G
CREAM TOPICAL
Qty: 28 G | Refills: 0 | Status: SHIPPED | OUTPATIENT
Start: 2025-04-04

## 2025-04-04 RX ORDER — SIMETHICONE 125 MG
125 TABLET,CHEWABLE ORAL EVERY 6 HOURS PRN
Qty: 60 TABLET | Refills: 3 | Status: SHIPPED | OUTPATIENT
Start: 2025-04-04

## 2025-06-13 RX ORDER — LEVETIRACETAM 500 MG/1
500 TABLET ORAL 2 TIMES DAILY
Qty: 120 TABLET | Refills: 1 | Status: SHIPPED | OUTPATIENT
Start: 2025-06-13 | End: 2025-10-11

## 2025-06-13 NOTE — TELEPHONE ENCOUNTER
Patient's grandson called in requesting medication refill for:    Medication: levETIRAcetam (KEPPRA) 500 MG tablet     Active on med list: no     Last office visit: 2025     Next office visit: 2025     Please refill if request is acceptable. Thank you.

## (undated) DEVICE — SNARE ENDOSCP L240CM W15MM SHTH DIA2.4MM CHN 2.8MM STIFF

## (undated) DEVICE — SNARE ENDOSCP AD L240CM LOOP W10MM SHTH DIA2.4MM RND INSUL

## (undated) DEVICE — ENDO KIT W/SYRINGE: Brand: MEDLINE INDUSTRIES, INC.

## (undated) DEVICE — GLOVE ORANGE PI 8 1/2   MSG9085

## (undated) DEVICE — FORCEPS BX 240CM 2.4MM L NDL RAD JAW 4 M00513334

## (undated) DEVICE — DEFENDO AIR WATER SUCTION AND BIOPSY VALVE KIT FOR  OLYMPUS: Brand: DEFENDO AIR/WATER/SUCTION AND BIOPSY VALVE